# Patient Record
Sex: MALE | Race: BLACK OR AFRICAN AMERICAN | NOT HISPANIC OR LATINO | Employment: OTHER | URBAN - METROPOLITAN AREA
[De-identification: names, ages, dates, MRNs, and addresses within clinical notes are randomized per-mention and may not be internally consistent; named-entity substitution may affect disease eponyms.]

---

## 2017-05-16 ENCOUNTER — HISTORICAL (OUTPATIENT)
Dept: RADIOLOGY | Facility: HOSPITAL | Age: 32
End: 2017-05-16

## 2019-09-19 ENCOUNTER — HISTORICAL (OUTPATIENT)
Dept: RADIOLOGY | Facility: HOSPITAL | Age: 34
End: 2019-09-19

## 2020-11-16 ENCOUNTER — HISTORICAL (OUTPATIENT)
Dept: RADIOLOGY | Facility: HOSPITAL | Age: 35
End: 2020-11-16

## 2021-04-21 ENCOUNTER — HISTORICAL (OUTPATIENT)
Dept: SURGERY | Facility: HOSPITAL | Age: 36
End: 2021-04-21

## 2021-04-21 LAB
ABS NEUT (OLG): 6 X10(3)/MCL (ref 1.5–6.9)
ALBUMIN SERPL-MCNC: 3.9 GM/DL (ref 3.5–5)
ALBUMIN/GLOB SERPL: 1.1 RATIO (ref 1.1–2)
ALP SERPL-CCNC: 84 UNIT/L (ref 40–150)
ALT SERPL-CCNC: 18 UNIT/L (ref 0–55)
AST SERPL-CCNC: 14 UNIT/L (ref 5–34)
BASOPHILS # BLD AUTO: 0 X10(3)/MCL (ref 0–0.1)
BASOPHILS NFR BLD AUTO: 0 % (ref 0–1)
BILIRUB SERPL-MCNC: 0.2 MG/DL
BILIRUBIN DIRECT+TOT PNL SERPL-MCNC: 0.1 MG/DL (ref 0–0.5)
BILIRUBIN DIRECT+TOT PNL SERPL-MCNC: 0.1 MG/DL (ref 0–0.8)
BUN SERPL-MCNC: 12 MG/DL (ref 8.9–20.6)
CALCIUM SERPL-MCNC: 9.3 MG/DL (ref 8.4–10.2)
CHLORIDE SERPL-SCNC: 105 MMOL/L (ref 98–107)
CO2 SERPL-SCNC: 31 MMOL/L (ref 22–29)
CREAT SERPL-MCNC: 1.07 MG/DL (ref 0.73–1.18)
EOSINOPHIL # BLD AUTO: 0.3 X10(3)/MCL (ref 0–0.6)
EOSINOPHIL NFR BLD AUTO: 3 % (ref 0–5)
ERYTHROCYTE [DISTWIDTH] IN BLOOD BY AUTOMATED COUNT: 13.8 % (ref 11.5–17)
GLOBULIN SER-MCNC: 3.5 GM/DL (ref 2.4–3.5)
GLUCOSE SERPL-MCNC: 80 MG/DL (ref 74–100)
HCT VFR BLD AUTO: 43.7 % (ref 42–52)
HGB BLD-MCNC: 13.6 GM/DL (ref 14–18)
IMM GRANULOCYTES # BLD AUTO: 0.03 10*3/UL (ref 0–0.02)
IMM GRANULOCYTES NFR BLD AUTO: 0.3 % (ref 0–0.43)
INR PPP: 1.1 (ref 2–3)
LYMPHOCYTES # BLD AUTO: 2.1 X10(3)/MCL (ref 0.5–4.1)
LYMPHOCYTES NFR BLD AUTO: 22 % (ref 15–40)
MCH RBC QN AUTO: 25 PG (ref 27–34)
MCHC RBC AUTO-ENTMCNC: 31 GM/DL (ref 31–36)
MCV RBC AUTO: 82 FL (ref 80–99)
MONOCYTES # BLD AUTO: 0.8 X10(3)/MCL (ref 0–1.1)
MONOCYTES NFR BLD AUTO: 9 % (ref 4–12)
NEUTROPHILS # BLD AUTO: 6 X10(3)/MCL (ref 1.5–6.9)
NEUTROPHILS NFR BLD AUTO: 65 % (ref 43–75)
PLATELET # BLD AUTO: 182 X10(3)/MCL (ref 140–400)
PMV BLD AUTO: 11.2 FL (ref 6.8–10)
POTASSIUM SERPL-SCNC: 4.1 MMOL/L (ref 3.5–5.1)
PROT SERPL-MCNC: 7.4 GM/DL (ref 6.4–8.3)
PROTHROMBIN TIME: 13.8 SEC (ref 11.7–14.5)
RBC # BLD AUTO: 5.35 X10(6)/MCL (ref 4.7–6.1)
SODIUM SERPL-SCNC: 141 MMOL/L (ref 136–145)
WBC # SPEC AUTO: 9.3 X10(3)/MCL (ref 4.5–11.5)

## 2022-06-10 ENCOUNTER — HOSPITAL ENCOUNTER (EMERGENCY)
Facility: HOSPITAL | Age: 37
Discharge: HOME | End: 2022-06-10
Attending: STUDENT IN AN ORGANIZED HEALTH CARE EDUCATION/TRAINING PROGRAM
Payer: MEDICAID

## 2022-06-10 VITALS
TEMPERATURE: 98 F | RESPIRATION RATE: 18 BRPM | HEART RATE: 71 BPM | DIASTOLIC BLOOD PRESSURE: 79 MMHG | SYSTOLIC BLOOD PRESSURE: 115 MMHG | WEIGHT: 165 LBS | OXYGEN SATURATION: 100 % | BODY MASS INDEX: 25.9 KG/M2 | HEIGHT: 67 IN

## 2022-06-10 DIAGNOSIS — M1A.0710 IDIOPATHIC CHRONIC GOUT OF RIGHT ANKLE WITHOUT TOPHUS: Primary | ICD-10-CM

## 2022-06-10 PROCEDURE — 96372 THER/PROPH/DIAG INJ SC/IM: CPT | Performed by: PHYSICIAN ASSISTANT

## 2022-06-10 PROCEDURE — 63600175 PHARM REV CODE 636 W HCPCS: Performed by: PHYSICIAN ASSISTANT

## 2022-06-10 PROCEDURE — 99284 EMERGENCY DEPT VISIT MOD MDM: CPT | Mod: 25

## 2022-06-10 RX ORDER — COLCHICINE 0.6 MG/1
0.6 TABLET ORAL DAILY
Qty: 3 TABLET | Refills: 0 | OUTPATIENT
Start: 2022-06-10 | End: 2022-09-22

## 2022-06-10 RX ADMIN — METHYLPREDNISOLONE SODIUM SUCCINATE 20 MG: 40 INJECTION, POWDER, FOR SOLUTION INTRAMUSCULAR; INTRAVENOUS at 09:06

## 2022-06-11 NOTE — ED PROVIDER NOTES
"Encounter Date: 6/10/2022       History     Chief Complaint   Patient presents with    Foot Injury     REPORTS NEEDING A CORTISONE SHOT FOR HIS GOUT FLARE UP IN RIGHT FOOT; STARTED TAKING ALLOPURINOL A FEW WEEKS AGO      Patient with a history of gout currently going through a flare up comes in with exacerbation of symptoms. He is currently on allopurinol  Once a day but it is not helping him. He is scheduled to follow up with his PCP next week.         Review of patient's allergies indicates:   Allergen Reactions    Ibuprofen Other (See Comments)     Per patient triggers " my asthma".     Penicillins      Past Medical History:   Diagnosis Date    Anxiety     GERD (gastroesophageal reflux disease)     Gout, unspecified     Sciatic nerve disease      No past surgical history on file.  No family history on file.  Social History     Tobacco Use    Smoking status: Current Every Day Smoker     Packs/day: 0.50    Smokeless tobacco: Never Used   Substance Use Topics    Alcohol use: Yes    Drug use: Yes     Types: Marijuana     Review of Systems   Constitutional: Negative for fever.   HENT: Negative for sore throat.    Respiratory: Negative for shortness of breath.    Cardiovascular: Negative for chest pain.   Gastrointestinal: Negative for nausea.   Genitourinary: Negative for dysuria.   Musculoskeletal: Positive for arthralgias and back pain.        Right foot pain      Skin: Negative for rash.   Neurological: Negative for weakness.   Hematological: Does not bruise/bleed easily.       Physical Exam     Initial Vitals [06/10/22 2051]   BP Pulse Resp Temp SpO2   115/79 71 18 98.2 °F (36.8 °C) 100 %      MAP       --         Physical Exam    Nursing note and vitals reviewed.  Constitutional: He appears well-developed and well-nourished.   Neck:   Normal range of motion.  Cardiovascular: Normal rate and regular rhythm.   Musculoskeletal:         General: Normal range of motion.      Cervical back: Normal range of " motion.      Right ankle: Swelling present. No tenderness. Normal range of motion.      Right Achilles Tendon: Normal.      Right foot: Normal range of motion and normal capillary refill. No swelling. Normal pulse.      Left foot: Normal.     Neurological: He is alert and oriented to person, place, and time.   Skin: Skin is warm and dry.   Psychiatric: His behavior is normal. Judgment normal.         ED Course   Procedures  Labs Reviewed - No data to display       Imaging Results    None          Medications   methylPREDNISolone sodium succinate injection 20 mg (has no administration in time range)     Medical Decision Making:   Initial Assessment:   Right foot and toe tenderness history of gout.   Differential Diagnosis:   Gout     ED Management:  Antiinflammatory follow up with pcp                        Clinical Impression:   Final diagnoses:  [M1A.0710] Idiopathic chronic gout of right ankle without tophus (Primary)          ED Disposition Condition    Discharge Stable        ED Prescriptions     Medication Sig Dispense Start Date End Date Auth. Provider    colchicine (COLCRYS) 0.6 mg tablet Take 1 tablet (0.6 mg total) by mouth once daily. Take 1.2 mg x 1 day   Then 0.6 mg x 1 hour later  Repeat for 3 days for 3 days 3 tablet 6/10/2022 6/13/2022 TRUDY Cevallos        Follow-up Information     Follow up With Specialties Details Why Contact Info    Roby Caldwell NP Family Medicine In 1 week  [address] [facility] [address]  [phone]      Roby Caldwell NP Family Medicine  If symptoms worsen [address] [facility] [address]  [phone]             TRUDY Cevallos  06/10/22 2722

## 2022-09-20 ENCOUNTER — HOSPITAL ENCOUNTER (EMERGENCY)
Facility: HOSPITAL | Age: 37
Discharge: LEFT BEFORE DISCHARGE | End: 2022-09-20
Payer: MEDICAID

## 2022-09-20 VITALS
TEMPERATURE: 99 F | BODY MASS INDEX: 25.58 KG/M2 | WEIGHT: 163 LBS | RESPIRATION RATE: 20 BRPM | HEART RATE: 62 BPM | HEIGHT: 67 IN | SYSTOLIC BLOOD PRESSURE: 116 MMHG | OXYGEN SATURATION: 99 % | DIASTOLIC BLOOD PRESSURE: 73 MMHG

## 2022-09-20 DIAGNOSIS — E86.0 DEHYDRATION: Primary | ICD-10-CM

## 2022-09-20 PROCEDURE — 99900041 HC LEFT WITHOUT BEING SEEN- EMERGENCY

## 2022-09-20 PROCEDURE — 93010 EKG 12-LEAD: ICD-10-PCS | Mod: ,,, | Performed by: INTERNAL MEDICINE

## 2022-09-20 PROCEDURE — 93010 ELECTROCARDIOGRAM REPORT: CPT | Mod: ,,, | Performed by: INTERNAL MEDICINE

## 2022-09-20 PROCEDURE — 93005 ELECTROCARDIOGRAM TRACING: CPT

## 2022-09-22 ENCOUNTER — HOSPITAL ENCOUNTER (EMERGENCY)
Facility: HOSPITAL | Age: 37
Discharge: HOME | End: 2022-09-22
Attending: FAMILY MEDICINE
Payer: MEDICAID

## 2022-09-22 VITALS
OXYGEN SATURATION: 100 % | RESPIRATION RATE: 18 BRPM | DIASTOLIC BLOOD PRESSURE: 85 MMHG | WEIGHT: 163 LBS | TEMPERATURE: 99 F | HEIGHT: 67 IN | BODY MASS INDEX: 25.58 KG/M2 | HEART RATE: 92 BPM | SYSTOLIC BLOOD PRESSURE: 134 MMHG

## 2022-09-22 DIAGNOSIS — M10.9 ACUTE GOUT OF RIGHT ANKLE, UNSPECIFIED CAUSE: Primary | ICD-10-CM

## 2022-09-22 DIAGNOSIS — K04.7 DENTAL ABSCESS: ICD-10-CM

## 2022-09-22 PROCEDURE — 99284 EMERGENCY DEPT VISIT MOD MDM: CPT | Mod: 25

## 2022-09-22 PROCEDURE — 25000003 PHARM REV CODE 250: Performed by: FAMILY MEDICINE

## 2022-09-22 PROCEDURE — 96372 THER/PROPH/DIAG INJ SC/IM: CPT | Performed by: FAMILY MEDICINE

## 2022-09-22 PROCEDURE — 63600175 PHARM REV CODE 636 W HCPCS: Performed by: FAMILY MEDICINE

## 2022-09-22 RX ORDER — METHYLPREDNISOLONE 4 MG/1
TABLET ORAL
Qty: 21 EACH | Refills: 0 | Status: SHIPPED | OUTPATIENT
Start: 2022-09-22

## 2022-09-22 RX ORDER — KETOROLAC TROMETHAMINE 30 MG/ML
30 INJECTION, SOLUTION INTRAMUSCULAR; INTRAVENOUS
Status: COMPLETED | OUTPATIENT
Start: 2022-09-22 | End: 2022-09-22

## 2022-09-22 RX ORDER — DEXAMETHASONE SODIUM PHOSPHATE 4 MG/ML
8 INJECTION, SOLUTION INTRA-ARTICULAR; INTRALESIONAL; INTRAMUSCULAR; INTRAVENOUS; SOFT TISSUE
Status: COMPLETED | OUTPATIENT
Start: 2022-09-22 | End: 2022-09-22

## 2022-09-22 RX ORDER — CARIPRAZINE 1.5 MG/1
1.5 CAPSULE, GELATIN COATED ORAL DAILY
COMMUNITY
Start: 2022-09-20

## 2022-09-22 RX ORDER — CLINDAMYCIN HYDROCHLORIDE 300 MG/1
300 CAPSULE ORAL EVERY 8 HOURS
Qty: 30 CAPSULE | Refills: 0 | Status: SHIPPED | OUTPATIENT
Start: 2022-09-22 | End: 2022-10-02

## 2022-09-22 RX ORDER — CLONAZEPAM 1 MG/1
1 TABLET, ORALLY DISINTEGRATING ORAL
COMMUNITY
Start: 2021-04-21

## 2022-09-22 RX ORDER — ALLOPURINOL 100 MG/1
100 TABLET ORAL DAILY
COMMUNITY
Start: 2022-08-26

## 2022-09-22 RX ORDER — CHLORHEXIDINE GLUCONATE ORAL RINSE 1.2 MG/ML
15 SOLUTION DENTAL 2 TIMES DAILY
Qty: 420 ML | Refills: 0 | Status: SHIPPED | OUTPATIENT
Start: 2022-09-22 | End: 2022-10-06

## 2022-09-22 RX ORDER — HYDROCODONE BITARTRATE AND ACETAMINOPHEN 7.5; 325 MG/1; MG/1
1 TABLET ORAL EVERY 6 HOURS PRN
Qty: 12 TABLET | Refills: 0 | Status: SHIPPED | OUTPATIENT
Start: 2022-09-22 | End: 2022-09-25

## 2022-09-22 RX ORDER — COLCHICINE 0.6 MG/1
1.2 TABLET, FILM COATED ORAL
Status: COMPLETED | OUTPATIENT
Start: 2022-09-22 | End: 2022-09-22

## 2022-09-22 RX ORDER — FLUTICASONE PROPIONATE AND SALMETEROL XINAFOATE 115; 21 UG/1; UG/1
2 AEROSOL, METERED RESPIRATORY (INHALATION) 2 TIMES DAILY
COMMUNITY
Start: 2022-08-26

## 2022-09-22 RX ORDER — MONTELUKAST SODIUM 10 MG/1
10 TABLET ORAL NIGHTLY
COMMUNITY
Start: 2022-07-22

## 2022-09-22 RX ORDER — BUPROPION HYDROCHLORIDE 100 MG/1
TABLET, EXTENDED RELEASE ORAL
COMMUNITY

## 2022-09-22 RX ADMIN — DEXAMETHASONE SODIUM PHOSPHATE 8 MG: 4 INJECTION, SOLUTION INTRA-ARTICULAR; INTRALESIONAL; INTRAMUSCULAR; INTRAVENOUS; SOFT TISSUE at 10:09

## 2022-09-22 RX ADMIN — KETOROLAC TROMETHAMINE 30 MG: 30 INJECTION, SOLUTION INTRAMUSCULAR at 10:09

## 2022-09-22 RX ADMIN — COLCHICINE 1.2 MG: 0.6 TABLET, FILM COATED ORAL at 10:09

## 2022-09-24 NOTE — ED PROVIDER NOTES
"Encounter Date: 9/22/2022       History     Chief Complaint   Patient presents with    Dental Pain    Gout     Pt to ed with C/O top right dental pain, has broken tooth, also pain right foot d/t gout.      36-year-old male presents with nontraumatic right ankle pain and swelling for the past 2-3 days.  Patient has a history of gout and states this is similar to his normal gout flare.  Patient is also complaining of ongoing right upper dental pain.  Patient has a decaying tooth and was told he needed to see a oral surgeon in [place].  Patient has been unable to follow-up.  Patient denies fever drainage.  No other complaints.    Review of patient's allergies indicates:   Allergen Reactions    Aspirin Shortness Of Breath    Ibuprofen Other (See Comments)     Per patient triggers " my asthma".     Penicillins      Past Medical History:   Diagnosis Date    Anxiety     GERD (gastroesophageal reflux disease)     Gout, unspecified     Sciatic nerve disease      History reviewed. No pertinent surgical history.  No family history on file.  Social History     Tobacco Use    Smoking status: Every Day     Packs/day: 0.50     Types: Cigarettes    Smokeless tobacco: Never   Substance Use Topics    Alcohol use: Yes    Drug use: Yes     Types: Marijuana     Review of Systems   Constitutional: Negative.    HENT:  Positive for dental problem.    Eyes: Negative.    Respiratory: Negative.     Cardiovascular: Negative.    Gastrointestinal: Negative.    Endocrine: Negative.    Genitourinary: Negative.    Musculoskeletal:  Positive for joint swelling.   Skin: Negative.    Allergic/Immunologic: Negative.    Neurological: Negative.    Hematological: Negative.    Psychiatric/Behavioral: Negative.       Physical Exam     Initial Vitals [09/22/22 2142]   BP Pulse Resp Temp SpO2   134/85 92 18 98.6 °F (37 °C) 100 %      MAP       --         Physical Exam    Nursing note and vitals reviewed.  Constitutional: He appears well-developed and " well-nourished.   HENT:   Head: Normocephalic and atraumatic.   Mouth/Throat: Oropharynx is clear and moist.   Right upper premolar/molar-fractured and decaying teeth noted.  Mild erythema over the lateral gumline consistent with dental abscess.  No fluctuance or drainage.  Nothing to drain.   Eyes: Conjunctivae and EOM are normal. Pupils are equal, round, and reactive to light.   Neck: Neck supple.   Normal range of motion.  Cardiovascular:  Normal rate and regular rhythm.           Pulmonary/Chest: Breath sounds normal.   Abdominal: Bowel sounds are normal.   Musculoskeletal:         General: Normal range of motion.      Cervical back: Normal range of motion and neck supple.      Comments: Right ankle-mild inflammation noted diffusely.  No erythema or warmth.  No evidence of infection.  Mildly tender to palpation.  Consistent with gout flare.  Full range of motion, strength 5/5.  Sensation motion circulation intact throughout.     Neurological: He is alert and oriented to person, place, and time. He has normal strength.   Skin: Skin is warm and dry. Capillary refill takes less than 2 seconds.   Psychiatric: He has a normal mood and affect.       ED Course   Procedures  Labs Reviewed - No data to display       Imaging Results    None          Medications   ketorolac injection 30 mg (30 mg Intramuscular Given 9/22/22 2249)   dexamethasone injection 8 mg (8 mg Intramuscular Given 9/22/22 2249)   colchicine capsule/tablet 1.2 mg (1.2 mg Oral Given 9/22/22 2249)                              Clinical Impression:   Final diagnoses:  [M10.9] Acute gout of right ankle, unspecified cause (Primary)  [K04.7] Dental abscess        ED Disposition Condition    Discharge Stable          ED Prescriptions       Medication Sig Dispense Start Date End Date Auth. Provider    methylPREDNISolone (MEDROL DOSEPACK) 4 mg tablet use as directed 21 each 9/22/2022 -- Giovanni Lozano MD    HYDROcodone-acetaminophen (NORCO) 7.5-325 mg per  tablet Take 1 tablet by mouth every 6 (six) hours as needed for Pain. 12 tablet 9/22/2022 9/25/2022 Giovanni Lozano MD    clindamycin (CLEOCIN) 300 MG capsule Take 1 capsule (300 mg total) by mouth every 8 (eight) hours. for 10 days 30 capsule 9/22/2022 10/2/2022 Giovanni Lozano MD    chlorhexidine (PERIDEX) 0.12 % solution Use as directed 15 mLs in the mouth or throat 2 (two) times daily. for 14 days 420 mL 9/22/2022 10/6/2022 Giovanni Lozano MD          Follow-up Information       Follow up With Specialties Details Why Contact Info    Roby Caldwell NP Family Medicine   [facility] [address]  [phone]      Dentist  Go to                Giovanni Lozano MD  09/24/22 4317

## 2023-02-09 ENCOUNTER — HOSPITAL ENCOUNTER (EMERGENCY)
Facility: HOSPITAL | Age: 38
Discharge: HOME | End: 2023-02-09
Attending: EMERGENCY MEDICINE
Payer: MEDICAID

## 2023-02-09 VITALS
RESPIRATION RATE: 18 BRPM | SYSTOLIC BLOOD PRESSURE: 121 MMHG | DIASTOLIC BLOOD PRESSURE: 80 MMHG | BODY MASS INDEX: 24.17 KG/M2 | WEIGHT: 154 LBS | OXYGEN SATURATION: 100 % | TEMPERATURE: 98 F | HEIGHT: 67 IN | HEART RATE: 75 BPM

## 2023-02-09 DIAGNOSIS — F41.9 ANXIETY: Primary | ICD-10-CM

## 2023-02-09 PROCEDURE — 99282 EMERGENCY DEPT VISIT SF MDM: CPT

## 2023-02-09 NOTE — Clinical Note
"Venu Proin" Aaron was seen and treated in our emergency department on 2/9/2023.  He may return to work on 02/13/2023.       If you have any questions or concerns, please don't hesitate to call.      Lisa Abdalla NP"

## 2023-02-09 NOTE — DISCHARGE INSTRUCTIONS
Continue taking prescribed psychiatric medications.     Thanks for letting us take care of you today!  It is our goal to give you courteous care and to keep you comfortable and informed, if you have any questions before you leave I will be happy to try and answer them.    Here is some advice after your visit:      Your visit in the emergency department is NOT definitive care - please follow-up with your primary care doctor and/or specialist within 1 week.  Please return if you have any worsening in your condition or if you have any other concerns.

## 2023-02-09 NOTE — ED PROVIDER NOTES
"Encounter Date: 2/9/2023       History     Chief Complaint   Patient presents with    Anxiety     Pt c/o anxiety flare up after a "situation" at work yesterday.     37 y.o.  male with a history of anxiety, GERD, and sciatica presents to Emergency Department with a chief complaint of anxiety. Symptoms began yesterday after an incident at work and have been constant since onset. Patient reports he has been overwhelmed lately with work and home circumstances. Associated symptoms include none. Patient has reached out to his therapist and reports he has been compliant with psychiatric medications. The patient denies SI, HI, hallucinations, CP, SOB, weakness, or fever. No other reported symptoms at this time.      The history is provided by the patient. No  was used.   Anxiety  This is a chronic problem. The current episode started yesterday. The problem occurs daily. The problem has not changed since onset.Pertinent negatives include no chest pain, no abdominal pain, no headaches and no shortness of breath.   Review of patient's allergies indicates:   Allergen Reactions    Aspirin Shortness Of Breath    Ibuprofen Other (See Comments)     Per patient triggers " my asthma".     Penicillins      Past Medical History:   Diagnosis Date    Anxiety     GERD (gastroesophageal reflux disease)     Gout, unspecified     Sciatic nerve disease      History reviewed. No pertinent surgical history.  No family history on file.  Social History     Tobacco Use    Smoking status: Every Day     Packs/day: 0.50     Types: Cigarettes    Smokeless tobacco: Never   Substance Use Topics    Alcohol use: Yes    Drug use: Yes     Types: Marijuana     Review of Systems   Constitutional:  Negative for chills, fatigue and fever.   Eyes:  Negative for photophobia and visual disturbance.   Respiratory:  Negative for shortness of breath, wheezing and stridor.    Cardiovascular:  Negative for chest pain and leg " swelling.   Gastrointestinal:  Negative for abdominal pain, nausea and vomiting.   Neurological:  Negative for dizziness, syncope, weakness and headaches.   Psychiatric/Behavioral:  Negative for hallucinations and suicidal ideas. The patient is nervous/anxious.    All other systems reviewed and are negative.    Physical Exam     Initial Vitals [02/09/23 1123]   BP Pulse Resp Temp SpO2   121/80 75 18 97.9 °F (36.6 °C) 100 %      MAP       --         Physical Exam    Nursing note and vitals reviewed.  Constitutional: He appears well-developed and well-nourished. He is not diaphoretic. He is cooperative.  Non-toxic appearance. No distress.   HENT:   Head: Normocephalic and atraumatic.   Right Ear: External ear normal.   Left Ear: External ear normal.   Nose: Nose normal.   Mouth/Throat: Oropharynx is clear and moist. No oropharyngeal exudate.   Eyes: Conjunctivae and EOM are normal. Pupils are equal, round, and reactive to light. Right eye exhibits no discharge. Left eye exhibits no discharge.   Neck: Neck supple. No JVD present.   Normal range of motion.  Cardiovascular:  Normal rate, regular rhythm, S1 normal, S2 normal, normal heart sounds, intact distal pulses and normal pulses.           Pulmonary/Chest: Effort normal and breath sounds normal. No accessory muscle usage or stridor. No tachypnea. No respiratory distress. He has no decreased breath sounds. He has no wheezes. He has no rhonchi. He has no rales. He exhibits no tenderness.   Abdominal: Abdomen is soft. Bowel sounds are normal. He exhibits no distension. There is no abdominal tenderness. There is no guarding.   Musculoskeletal:         General: No tenderness or edema. Normal range of motion.      Cervical back: Normal range of motion and neck supple.     Neurological: He is alert and oriented to person, place, and time. He has normal strength. No sensory deficit. GCS score is 15. GCS eye subscore is 4. GCS verbal subscore is 5. GCS motor subscore is 6.    Skin: Skin is warm and dry. Capillary refill takes less than 2 seconds. No rash noted. No erythema.   Psychiatric: His speech is normal and behavior is normal. Thought content normal. His mood appears anxious. Cognition and memory are normal.       ED Course   Procedures  Labs Reviewed - No data to display       Imaging Results    None          Medications - No data to display  Medical Decision Making:   Initial Assessment:   Patient awake, alert, has non-labored breathing, and follows commands appropriately. C/o anxiety after incident at work on yesterday. Patient reports he has been overwhelmed with work and life circumstances and needs a few days off from work. Denies SI, HI, hallucinations, or any other complaints. Called therapist on yesterday due to symptoms. Compliant with psychiatric medications.   Differential Diagnosis:   Anxiety, Stress, Panic Attack   ED Management:  Co-morbidities and/or factors adding to the complexity or risk for the patient?: none  Differential diagnoses: Anxiety, Stress, Panic Attack   Decision to obtain previous or outside records?: I did not obtain previous or outside records.   Chart Review (nursing home, outside records, CareEverywhere): none  Review of RX medications/new RX prescribed by me?: Patient takes Klonipin and Wellbutrin for anxiety. Is compliant with medication.   Labs/imaging/other tests obtained/considered (risk/benefits of testing discussed): None  Labs/tests intepretation: none  My independent imaging interpretation: none  Treatment/interventions, IV fluids, IV medications: none  Complex management (IV controlled substances, went to OR, DNR, meds requiring monitoring, transfer, etc)?: none  Workup/treatment affected by social determinants of health?:none   Point of care US done/interpretation: none  Consults/radiologist/EMS/social work/family discussion/alternate history: none  Advanced care planning/end of life discussion: none  Shared decision making:  Discussed plan of care and interventions with patient. Patient reports he is overwhelmed and stressed and needs a few days off from work. Work excuse given. Denies SI/HI/hallucinations. Reports anti-anxiety medication is assisting with anxiety. Patient has outpatient follow up with therapist, reports he left a message with provider on yesterday. Agreed to and aware of plan of care. Comfortable being discharged home. Will continue to take prescribed medications.    ETOH/smoking/drug cessation discussion: none  Dispo: Patient discharged home. Patient denies new or additional complaints; no further tests indicated at this time. Verbalized understanding of instructions. No emergent or apparent distress noted prior to discharge. To follow up with PCP in 1 week as needed. Strict ER return precautions given.                             Clinical Impression:   Final diagnoses:  [F41.9] Anxiety (Primary)        ED Disposition Condition    Discharge Stable          ED Prescriptions    None       Follow-up Information       Follow up With Specialties Details Why Contact Info    Juno Mendoza MD Family Medicine Call in 1 week If symptoms worsen, As needed [address] [facility] [address]  [phone]      [facility] - Emergency Dept Emergency Medicine Go to  If symptoms worsen, As needed [address]  [facility] [phone]             Lisa Abdalla NP  02/09/23 7302

## 2023-02-10 ENCOUNTER — HOSPITAL ENCOUNTER (EMERGENCY)
Facility: HOSPITAL | Age: 38
Discharge: LEFT BEFORE DISCHARGE | End: 2023-02-10
Payer: MEDICAID

## 2023-02-10 VITALS
OXYGEN SATURATION: 100 % | RESPIRATION RATE: 18 BRPM | DIASTOLIC BLOOD PRESSURE: 77 MMHG | TEMPERATURE: 98 F | SYSTOLIC BLOOD PRESSURE: 117 MMHG | HEART RATE: 99 BPM

## 2023-02-10 PROCEDURE — 99283 EMERGENCY DEPT VISIT LOW MDM: CPT

## 2023-07-25 DIAGNOSIS — R19.7 DIARRHEA, UNSPECIFIED TYPE: Primary | ICD-10-CM

## 2023-08-18 ENCOUNTER — HOSPITAL ENCOUNTER (EMERGENCY)
Facility: HOSPITAL | Age: 38
Discharge: HOME | End: 2023-08-18
Attending: EMERGENCY MEDICINE
Payer: MEDICAID

## 2023-08-18 VITALS
OXYGEN SATURATION: 100 % | SYSTOLIC BLOOD PRESSURE: 106 MMHG | HEIGHT: 67 IN | DIASTOLIC BLOOD PRESSURE: 77 MMHG | HEART RATE: 86 BPM | TEMPERATURE: 97 F | RESPIRATION RATE: 18 BRPM | WEIGHT: 154 LBS | BODY MASS INDEX: 24.17 KG/M2

## 2023-08-18 DIAGNOSIS — E86.0 DEHYDRATION: Primary | ICD-10-CM

## 2023-08-18 DIAGNOSIS — K04.7 DENTAL ABSCESS: ICD-10-CM

## 2023-08-18 DIAGNOSIS — T67.1XXA HEAT SYNCOPE, INITIAL ENCOUNTER: ICD-10-CM

## 2023-08-18 LAB
ALBUMIN SERPL-MCNC: 4.2 G/DL (ref 3.5–5)
ALBUMIN/GLOB SERPL: 1.3 RATIO (ref 1.1–2)
ALP SERPL-CCNC: 87 UNIT/L (ref 40–150)
ALT SERPL-CCNC: 12 UNIT/L (ref 0–55)
APPEARANCE UR: CLEAR
AST SERPL-CCNC: 12 UNIT/L (ref 5–34)
BASOPHILS # BLD AUTO: 0.03 X10(3)/MCL
BASOPHILS NFR BLD AUTO: 0.3 %
BILIRUB SERPL-MCNC: 0.5 MG/DL
BILIRUB UR QL STRIP.AUTO: NEGATIVE
BUN SERPL-MCNC: 8.6 MG/DL (ref 8.9–20.6)
CALCIUM SERPL-MCNC: 9.8 MG/DL (ref 8.4–10.2)
CHLORIDE SERPL-SCNC: 105 MMOL/L (ref 98–107)
CK SERPL-CCNC: 148 U/L (ref 30–200)
CO2 SERPL-SCNC: 29 MMOL/L (ref 22–29)
COLOR UR: YELLOW
CREAT SERPL-MCNC: 1.24 MG/DL (ref 0.73–1.18)
EOSINOPHIL # BLD AUTO: 0.25 X10(3)/MCL (ref 0–0.9)
EOSINOPHIL NFR BLD AUTO: 2.1 %
ERYTHROCYTE [DISTWIDTH] IN BLOOD BY AUTOMATED COUNT: 13.2 % (ref 11.5–17)
FLUAV AG UPPER RESP QL IA.RAPID: NOT DETECTED
FLUBV AG UPPER RESP QL IA.RAPID: NOT DETECTED
GFR SERPLBLD CREATININE-BSD FMLA CKD-EPI: >60 MLS/MIN/1.73/M2
GLOBULIN SER-MCNC: 3.3 GM/DL (ref 2.4–3.5)
GLUCOSE SERPL-MCNC: 88 MG/DL (ref 74–100)
GLUCOSE UR QL STRIP.AUTO: NEGATIVE
HCT VFR BLD AUTO: 39.7 % (ref 42–52)
HGB BLD-MCNC: 13 G/DL (ref 14–18)
IMM GRANULOCYTES # BLD AUTO: 0.03 X10(3)/MCL (ref 0–0.04)
IMM GRANULOCYTES NFR BLD AUTO: 0.3 %
KETONES UR QL STRIP.AUTO: NEGATIVE
LEUKOCYTE ESTERASE UR QL STRIP.AUTO: NEGATIVE
LYMPHOCYTES # BLD AUTO: 2.26 X10(3)/MCL (ref 0.6–4.6)
LYMPHOCYTES NFR BLD AUTO: 18.9 %
MCH RBC QN AUTO: 25.8 PG (ref 27–31)
MCHC RBC AUTO-ENTMCNC: 32.7 G/DL (ref 33–36)
MCV RBC AUTO: 78.9 FL (ref 80–94)
MONOCYTES # BLD AUTO: 0.8 X10(3)/MCL (ref 0.1–1.3)
MONOCYTES NFR BLD AUTO: 6.7 %
NEUTROPHILS # BLD AUTO: 8.56 X10(3)/MCL (ref 2.1–9.2)
NEUTROPHILS NFR BLD AUTO: 71.7 %
NITRITE UR QL STRIP.AUTO: NEGATIVE
NRBC BLD AUTO-RTO: 0 %
PH UR STRIP.AUTO: 6 [PH]
PLATELET # BLD AUTO: 188 X10(3)/MCL (ref 130–400)
PMV BLD AUTO: 10.4 FL (ref 7.4–10.4)
POTASSIUM SERPL-SCNC: 4 MMOL/L (ref 3.5–5.1)
PROT SERPL-MCNC: 7.5 GM/DL (ref 6.4–8.3)
PROT UR QL STRIP.AUTO: NEGATIVE
RBC # BLD AUTO: 5.03 X10(6)/MCL (ref 4.7–6.1)
RBC UR QL AUTO: NEGATIVE
SARS-COV-2 RNA RESP QL NAA+PROBE: NOT DETECTED
SODIUM SERPL-SCNC: 142 MMOL/L (ref 136–145)
SP GR UR STRIP.AUTO: >=1.03
TROPONIN I SERPL-MCNC: <0.01 NG/ML (ref 0–0.04)
UROBILINOGEN UR STRIP-ACNC: 0.2
WBC # SPEC AUTO: 11.93 X10(3)/MCL (ref 4.5–11.5)

## 2023-08-18 PROCEDURE — 85025 COMPLETE CBC W/AUTO DIFF WBC: CPT | Performed by: PHYSICIAN ASSISTANT

## 2023-08-18 PROCEDURE — 99285 EMERGENCY DEPT VISIT HI MDM: CPT | Mod: 25

## 2023-08-18 PROCEDURE — 96374 THER/PROPH/DIAG INJ IV PUSH: CPT

## 2023-08-18 PROCEDURE — 82550 ASSAY OF CK (CPK): CPT | Performed by: PHYSICIAN ASSISTANT

## 2023-08-18 PROCEDURE — 80053 COMPREHEN METABOLIC PANEL: CPT | Performed by: PHYSICIAN ASSISTANT

## 2023-08-18 PROCEDURE — 63600175 PHARM REV CODE 636 W HCPCS: Performed by: PHYSICIAN ASSISTANT

## 2023-08-18 PROCEDURE — 81003 URINALYSIS AUTO W/O SCOPE: CPT | Performed by: PHYSICIAN ASSISTANT

## 2023-08-18 PROCEDURE — 0240U COVID/FLU A&B PCR: CPT | Performed by: PHYSICIAN ASSISTANT

## 2023-08-18 PROCEDURE — 93005 ELECTROCARDIOGRAM TRACING: CPT

## 2023-08-18 PROCEDURE — 93010 EKG 12-LEAD: ICD-10-PCS | Mod: ,,, | Performed by: STUDENT IN AN ORGANIZED HEALTH CARE EDUCATION/TRAINING PROGRAM

## 2023-08-18 PROCEDURE — 25000003 PHARM REV CODE 250: Performed by: PHYSICIAN ASSISTANT

## 2023-08-18 PROCEDURE — 93010 ELECTROCARDIOGRAM REPORT: CPT | Mod: ,,, | Performed by: STUDENT IN AN ORGANIZED HEALTH CARE EDUCATION/TRAINING PROGRAM

## 2023-08-18 PROCEDURE — 84484 ASSAY OF TROPONIN QUANT: CPT | Performed by: PHYSICIAN ASSISTANT

## 2023-08-18 RX ORDER — ONDANSETRON 2 MG/ML
4 INJECTION INTRAMUSCULAR; INTRAVENOUS
Status: COMPLETED | OUTPATIENT
Start: 2023-08-18 | End: 2023-08-18

## 2023-08-18 RX ORDER — CLINDAMYCIN HYDROCHLORIDE 300 MG/1
300 CAPSULE ORAL EVERY 8 HOURS
Qty: 30 CAPSULE | Refills: 0 | Status: SHIPPED | OUTPATIENT
Start: 2023-08-18 | End: 2023-08-28

## 2023-08-18 RX ADMIN — ONDANSETRON 4 MG: 2 INJECTION INTRAMUSCULAR; INTRAVENOUS at 05:08

## 2023-08-18 RX ADMIN — SODIUM CHLORIDE 1000 ML: 9 INJECTION, SOLUTION INTRAVENOUS at 05:08

## 2023-08-18 NOTE — ED PROVIDER NOTES
"Encounter Date: 8/18/2023       History     Chief Complaint   Patient presents with    Loss of Consciousness     Pt states became dizzy on last night followed by passing out and hitting head on television. Pt aaox3 currently. States feels dehydrated.     37-year-old man with a history of anxiety, GERD, gout and sciatic nerve issues presents to the ED for evaluation after syncopal episode last night.  States he was talking to a friend last night on the porch, when he became lightheaded and passed out hitting the left side of his head on a TV.  Notes a mild headache last night however that has since resolved.  Notes some intermittent nausea today.  Has been having chills and indigestion as well.  Denies neck pain, chest pain, shortness breath, vomiting, fever.  No known sick contacts.  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  States he has been trying to drink fluids however not sure if it is enough.    The history is provided by the patient. No  was used.     Review of patient's allergies indicates:   Allergen Reactions    Aspirin Shortness Of Breath    Ibuprofen Other (See Comments)     Per patient triggers " my asthma".     Penicillins      Past Medical History:   Diagnosis Date    Anxiety     Anxiety disorder, unspecified     GERD (gastroesophageal reflux disease)     Gout, unspecified     Sciatic nerve disease      No past surgical history on file.  No family history on file.  Social History     Tobacco Use    Smoking status: Every Day     Current packs/day: 0.50     Types: Cigarettes    Smokeless tobacco: Never   Substance Use Topics    Alcohol use: Yes    Drug use: Yes     Types: Marijuana     Review of Systems   Constitutional:  Positive for chills. Negative for fever.   HENT:  Positive for dental problem. Negative for congestion and sore throat.    Respiratory:  Negative for cough and shortness of breath.  "   Cardiovascular:  Negative for chest pain.   Gastrointestinal:  Positive for nausea. Negative for abdominal pain and vomiting.   Genitourinary:  Negative for dysuria.   Musculoskeletal:  Negative for back pain and neck pain.   Skin:  Negative for rash.   Neurological:  Positive for syncope, light-headedness and headaches. Negative for weakness.   Hematological:  Does not bruise/bleed easily.   All other systems reviewed and are negative.      Physical Exam     Initial Vitals [08/18/23 1427]   BP Pulse Resp Temp SpO2   106/77 86 18 97.3 °F (36.3 °C) 100 %      MAP       --         Physical Exam    Nursing note and vitals reviewed.  Constitutional: He appears well-developed and well-nourished.   HENT:   Head: Normocephalic and atraumatic. Head is without raccoon's eyes, without laceration, without right periorbital erythema and without left periorbital erythema.       Right Ear: External ear normal.   Left Ear: External ear normal.   Mouth/Throat: Uvula is midline and oropharynx is clear and moist. No oral lesions. No trismus in the jaw. Abnormal dentition. Dental abscesses (some swelling noted to left lower gumline, no drainable fluid collection) and dental caries present. No uvula swelling.   Eyes: EOM are normal. Pupils are equal, round, and reactive to light.   Neck: Neck supple.   Normal range of motion.   Full passive range of motion without pain.     Cardiovascular:  Normal rate, regular rhythm and normal heart sounds.           Pulmonary/Chest: Breath sounds normal.   Abdominal: Abdomen is soft.   Musculoskeletal:         General: Normal range of motion.      Cervical back: Full passive range of motion without pain, normal range of motion and neck supple. No spinous process tenderness or muscular tenderness. Normal range of motion.     Neurological: He is alert and oriented to person, place, and time. He has normal strength. No sensory deficit. He displays a negative Romberg sign. Coordination and gait  normal. GCS score is 15. GCS eye subscore is 4. GCS verbal subscore is 5. GCS motor subscore is 6.   5/5 strength bilateral upper and lower extremities, no droop or drift noted on exam   Skin: Skin is warm and dry.   Psychiatric: He has a normal mood and affect.         ED Course   Procedures  Labs Reviewed   COMPREHENSIVE METABOLIC PANEL - Abnormal; Notable for the following components:       Result Value    Blood Urea Nitrogen 8.6 (*)     Creatinine 1.24 (*)     All other components within normal limits   CBC WITH DIFFERENTIAL - Abnormal; Notable for the following components:    WBC 11.93 (*)     Hgb 13.0 (*)     Hct 39.7 (*)     MCV 78.9 (*)     MCH 25.8 (*)     MCHC 32.7 (*)     All other components within normal limits   COVID/FLU A&B PCR - Normal    Narrative:     The Xpert Xpress SARS-CoV-2/FLU/RSV plus is a rapid, multiplexed real-time PCR test intended for the simultaneous qualitative detection and differentiation of SARS-CoV-2, Influenza A, Influenza B, and respiratory syncytial virus (RSV) viral RNA in either nasopharyngeal swab or nasal swab specimens.         TROPONIN I - Normal   CK - Normal   CBC W/ AUTO DIFFERENTIAL    Narrative:     The following orders were created for panel order CBC auto differential.  Procedure                               Abnormality         Status                     ---------                               -----------         ------                     CBC with Differential[[id number]]        Abnormal            Final result                 Please view results for these tests on the individual orders.   URINALYSIS, REFLEX TO URINE CULTURE     EKG Readings: (Independently Interpreted)   Initial Reading: No STEMI. Rhythm: Sinus Bradycardia. Heart Rate: 56. Ectopy: No Ectopy. Conduction: Normal. ST Segments: Normal ST Segments. T Waves: Normal. Axis: Normal.       Imaging Results              CT Head Without Contrast (Final result)  Result time 08/18/23 16:32:11      Final result  by Latisha Burton MD (08/18/23 16:32:11)                   Impression:      No acute intracranial abnormality.      Electronically signed by: Latisha Burton  Date:    08/18/2023  Time:    16:32               Narrative:    EXAMINATION:  CT HEAD WITHOUT CONTRAST    CLINICAL HISTORY:  Syncope, recurrent;    TECHNIQUE:  Axial scans were obtained from skull base to the vertex.    Coronal and sagittal reconstructions obtained from the axial data.    Automatic exposure control was utilized to limit radiation dose.    Contrast: None    Radiation Dose:    Total DLP: 743 mGy*cm    COMPARISON:  None    FINDINGS:  There is no acute intracranial hemorrhage or edema. The gray-white matter differentiation is preserved.    There is no mass effect or midline shift. The ventricles and sulci are normal in size. The basal cisterns are patent. There is no abnormal extra-axial fluid collection.    The calvarium and skull base are intact.  There is mild scattered paranasal sinus mucosal thickening.                                       Medications   sodium chloride 0.9% bolus 1,000 mL 1,000 mL (0 mLs Intravenous Stopped 8/18/23 1841)   ondansetron injection 4 mg (4 mg Intravenous Given 8/18/23 1740)     Medical Decision Making  Patient presenting after syncopal episode last night while sitting outside in the heat.  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  Denies any other concerning symptoms.  Labs, UA, EKG and head CT ordered.  COVID swab negative, CTA of the head negative for any acute abnormality.  EKG unremarkable.  Labs unremarkable besides mild leukocytosis and mildly elevated creatinine.  IV fluids ordered.  Patient not having any complaints at this time.  Will dispo home with instructions for strict oral hydration as well as antibiotics for dental abscess of the left lower gumline.    Differential diagnosis:  ACS, arrhythmia, TIA vs CVA, syncope, COCO, heat exhaustion, dehydration, UTI,  intracranial hemorrhage, skull fracture, COVID, flu, viral syndrome    Amount and/or Complexity of Data Reviewed  Labs: ordered. Decision-making details documented in ED Course.  Radiology: ordered. Decision-making details documented in ED Course.  ECG/medicine tests: ordered. Decision-making details documented in ED Course.    Risk  Prescription drug management.               ED Course as of 08/18/23 1814   Fri Aug 18, 2023   1702 SARS-CoV2 (COVID-19) Qualitative PCR: Not Detected [MA]   1702 Troponin I: <0.010 [MA]   1702 Creatinine(!): 1.24 [MA]   1730 Patient also now c/o left lower mouth pain and swelling that started yesterday. States he has been waiting on insurance to get him approved to remove his teeth however has not heard back in a few weeks. States he also ran out of his Protonix about 4-5 months ago and has been waiting on pre-authorization to get refills, that is why he thinks his indigestion has been so bad [MA]      ED Course User Index  [MA] Martin Mayfield PA-C                      Clinical Impression:   Final diagnoses:  [E86.0] Dehydration (Primary)  [T67.1XXA] Heat syncope, initial encounter  [K04.7] Dental abscess        ED Disposition Condition    Discharge Stable          ED Prescriptions       Medication Sig Dispense Start Date End Date Auth. Provider    clindamycin (CLEOCIN) 300 MG capsule Take 1 capsule (300 mg total) by mouth every 8 (eight) hours. for 10 days 30 capsule 8/18/2023 8/28/2023 Martin Mayfield PA-C          Follow-up Information       Follow up With Specialties Details Why Contact Info    Juno Mendoza MD Family Medicine   [address] [facility] [address] [phone]      [facility] - Emergency Dept Emergency Medicine In 1 week If symptoms worsen [address]  [place] [address] [phone]             Martin Mayfield PA-C  08/18/23 4156

## 2023-08-18 NOTE — ED TRIAGE NOTES
Pt states became dizzy on last night followed by passing out and hitting head on television. Pt aaox3 currently. States feels dehydrated.

## 2024-03-20 ENCOUNTER — HOSPITAL ENCOUNTER (EMERGENCY)
Facility: HOSPITAL | Age: 39
Discharge: HOME | End: 2024-03-20
Attending: INTERNAL MEDICINE
Payer: MEDICARE

## 2024-03-20 VITALS
RESPIRATION RATE: 18 BRPM | OXYGEN SATURATION: 99 % | TEMPERATURE: 99 F | WEIGHT: 140 LBS | DIASTOLIC BLOOD PRESSURE: 86 MMHG | BODY MASS INDEX: 21.97 KG/M2 | HEART RATE: 94 BPM | HEIGHT: 67 IN | SYSTOLIC BLOOD PRESSURE: 128 MMHG

## 2024-03-20 DIAGNOSIS — K08.9 CHRONIC DENTAL PAIN: ICD-10-CM

## 2024-03-20 DIAGNOSIS — K04.7 DENTAL INFECTION: Primary | ICD-10-CM

## 2024-03-20 DIAGNOSIS — G89.29 CHRONIC DENTAL PAIN: ICD-10-CM

## 2024-03-20 PROCEDURE — 99284 EMERGENCY DEPT VISIT MOD MDM: CPT

## 2024-03-20 RX ORDER — IBUPROFEN 600 MG/1
600 TABLET ORAL EVERY 6 HOURS PRN
Qty: 20 TABLET | Refills: 0 | Status: SHIPPED | OUTPATIENT
Start: 2024-03-20 | End: 2024-05-08

## 2024-03-20 RX ORDER — ACETAMINOPHEN AND CODEINE PHOSPHATE 300; 30 MG/1; MG/1
1 TABLET ORAL EVERY 6 HOURS PRN
Qty: 15 TABLET | Refills: 0 | OUTPATIENT
Start: 2024-03-20 | End: 2024-04-14

## 2024-03-20 RX ORDER — CLINDAMYCIN HYDROCHLORIDE 300 MG/1
300 CAPSULE ORAL EVERY 8 HOURS
Qty: 30 CAPSULE | Refills: 0 | Status: SHIPPED | OUTPATIENT
Start: 2024-03-20 | End: 2024-03-30

## 2024-03-20 RX ORDER — CHLORHEXIDINE GLUCONATE ORAL RINSE 1.2 MG/ML
15 SOLUTION DENTAL 2 TIMES DAILY
Qty: 473 ML | Refills: 0 | OUTPATIENT
Start: 2024-03-20 | End: 2024-06-05

## 2024-03-20 NOTE — ED PROVIDER NOTES
"     Source of History:  Patient, no limitations    Chief complaint:  Dental Pain      HPI:  Venu Walker is a 38 y.o. male presenting with Dental Pain         Patient who presents with complaint of toothache. Onset of symptoms was several days ago. Patient describes pain as aching. Pain severity now is severe. The pain does radiate. Patient has jaw swelling without fever >101. Pain is aggravated by use. Pain is alleviated by nothing. The patient denies other complaints. Patient has sought treatment by another care provider for this problem.       Review of Systems   Constitutional symptoms:  Negative except as documented in HPI.   Skin symptoms:  Negative except as documented in HPI.   HEENT symptoms:  Negative except as documented in HPI.   Respiratory symptoms:  Negative except as documented in HPI.   Cardiovascular symptoms:  Negative except as documented in HPI.   Gastrointestinal symptoms:  Negative except as documented in HPI.    Genitourinary symptoms:  Negative except as documented in HPI.   Musculoskeletal symptoms:  Negative except as documented in HPI.   Neurologic symptoms:  Negative except as documented in HPI.   Psychiatric symptoms:  Negative except as documented in HPI.   Allergy/immunologic symptoms:  Negative except as documented in HPI.             Additional review of systems information: All other systems reviewed and otherwise negative.      Review of patient's allergies indicates:   Allergen Reactions    Aspirin Shortness Of Breath    Ibuprofen Other (See Comments)     Per patient triggers " my asthma".     Penicillins        PMH:  As per HPI and below:    Past Medical History:   Diagnosis Date    Anxiety     Anxiety disorder, unspecified     GERD (gastroesophageal reflux disease)     Gout, unspecified     Sciatic nerve disease         History reviewed. No pertinent family history.    History reviewed. No pertinent surgical history.    Social History     Tobacco Use    Smoking status: " "Every Day     Current packs/day: 0.50     Types: Cigarettes    Smokeless tobacco: Never   Substance Use Topics    Alcohol use: Yes    Drug use: Yes     Types: Marijuana       Patient Active Problem List   Diagnosis    Pre-op testing    Caries        Physical Exam:    /86 (BP Location: Left arm, Patient Position: Sitting)   Pulse 94   Temp 98.8 °F (37.1 °C) (Oral)   Resp 18   Ht 5' 7" (1.702 m)   Wt 63.5 kg (140 lb)   SpO2 99%   BMI 21.93 kg/m²     Nursing note and vital signs reviewed.    General:  Alert, no acute distress.   Skin: Normal for Ethnic Origin, No cyanosis  HEENT: Normocephalic and atraumatic, Vision unchanged, Pupils symmetric,very poor dentition, #3 and #4 cavities into root (see pictures below)  Cardiovascular:  Regular rate and rhythm, No edema  Chest Wall: No deformity, equal chest rise  Respiratory:  Lungs are clear to auscultation, respirations are non-labored.    Musculoskeletal:  No deformity, Normal perfusion to all extremities  Gastrointestinal:  Soft, Non distended  Neurological:  Alert and oriented, normal motor observed, normal speech observed.    Psychiatric:  Cooperative, appropriate mood & affect.         Media Information    File Link    Scan on 3/20/2024  1:36 PM by Artemio Mckee DO        Media Information    File Link    Scan on 3/20/2024  1:36 PM by Artemio Mckee DO        Labs that have been ordered have been independently reviewed and interpreted by myself.     Old Chart Reviewed.      Initial Impression/ Differential Dx:  Fractured tooth, dental caries, gingivitis, stomatitis, abscess, pulpitis, nerve irritation      MDM:      Reviewed Nurses Note.    Reviewed Pertinent old records.    Orders Placed This Encounter    clindamycin (CLEOCIN) 300 MG capsule    chlorhexidine (PERIDEX) 0.12 % solution    acetaminophen-codeine 300-30mg (TYLENOL #3) 300-30 mg Tab    ibuprofen (ADVIL,MOTRIN) 600 MG tablet                    Labs Reviewed - No data to display "       No orders to display        No visits with results within 1 Day(s) from this visit.   Latest known visit with results is:   Admission on 08/18/2023, Discharged on 08/18/2023   Component Date Value Ref Range Status    Sodium Level 08/18/2023 142  136 - 145 mmol/L Final    Potassium Level 08/18/2023 4.0  3.5 - 5.1 mmol/L Final    Chloride 08/18/2023 105  98 - 107 mmol/L Final    Carbon Dioxide 08/18/2023 29  22 - 29 mmol/L Final    Glucose Level 08/18/2023 88  74 - 100 mg/dL Final    Blood Urea Nitrogen 08/18/2023 8.6 (L)  8.9 - 20.6 mg/dL Final    Creatinine 08/18/2023 1.24 (H)  0.73 - 1.18 mg/dL Final    Calcium Level Total 08/18/2023 9.8  8.4 - 10.2 mg/dL Final    Protein Total 08/18/2023 7.5  6.4 - 8.3 gm/dL Final    Albumin Level 08/18/2023 4.2  3.5 - 5.0 g/dL Final    Globulin 08/18/2023 3.3  2.4 - 3.5 gm/dL Final    Albumin/Globulin Ratio 08/18/2023 1.3  1.1 - 2.0 ratio Final    Bilirubin Total 08/18/2023 0.5  <=1.5 mg/dL Final    Alkaline Phosphatase 08/18/2023 87  40 - 150 unit/L Final    Alanine Aminotransferase 08/18/2023 12  0 - 55 unit/L Final    Aspartate Aminotransferase 08/18/2023 12  5 - 34 unit/L Final    eGFR 08/18/2023 >60  mls/min/1.73/m2 Final    Influenza A PCR 08/18/2023 Not Detected  Not Detected Final    Influenza B PCR 08/18/2023 Not Detected  Not Detected Final    SARS-CoV-2 PCR 08/18/2023 Not Detected  Not Detected, Negative, Invalid Final    Troponin-I 08/18/2023 <0.010  0.000 - 0.045 ng/mL Final    Color, UA 08/18/2023 Yellow  Yellow, Light-Yellow, Dark Yellow, [place], Straw Final    Appearance, UA 08/18/2023 Clear  Clear Final    Specific Gravity, UA 08/18/2023 >=1.030   Final    pH, UA 08/18/2023 6.0  5.0 - 8.5 Final    Protein, UA 08/18/2023 Negative  Negative Final    Glucose, UA 08/18/2023 Negative  Negative, Normal Final    Ketones, UA 08/18/2023 Negative  Negative Final    Blood, UA 08/18/2023 Negative  Negative Final    Bilirubin, UA 08/18/2023 Negative  Negative Final     Urobilinogen, UA 08/18/2023 0.2  0.2, 1.0, Normal Final    Nitrites, UA 08/18/2023 Negative  Negative Final    Leukocyte Esterase, UA 08/18/2023 Negative  Negative Final    Creatine Kinase 08/18/2023 148  30 - 200 U/L Final    WBC 08/18/2023 11.93 (H)  4.50 - 11.50 x10(3)/mcL Final    RBC 08/18/2023 5.03  4.70 - 6.10 x10(6)/mcL Final    Hgb 08/18/2023 13.0 (L)  14.0 - 18.0 g/dL Final    Hct 08/18/2023 39.7 (L)  42.0 - 52.0 % Final    MCV 08/18/2023 78.9 (L)  80.0 - 94.0 fL Final    MCH 08/18/2023 25.8 (L)  27.0 - 31.0 pg Final    MCHC 08/18/2023 32.7 (L)  33.0 - 36.0 g/dL Final    RDW 08/18/2023 13.2  11.5 - 17.0 % Final    Platelet 08/18/2023 188  130 - 400 x10(3)/mcL Final    MPV 08/18/2023 10.4  7.4 - 10.4 fL Final    Neut % 08/18/2023 71.7  % Final    Lymph % 08/18/2023 18.9  % Final    Mono % 08/18/2023 6.7  % Final    Eos % 08/18/2023 2.1  % Final    Basophil % 08/18/2023 0.3  % Final    Lymph # 08/18/2023 2.26  0.6 - 4.6 x10(3)/mcL Final    Neut # 08/18/2023 8.56  2.1 - 9.2 x10(3)/mcL Final    Mono # 08/18/2023 0.80  0.1 - 1.3 x10(3)/mcL Final    Eos # 08/18/2023 0.25  0 - 0.9 x10(3)/mcL Final    Baso # 08/18/2023 0.03  <=0.2 x10(3)/mcL Final    IG# 08/18/2023 0.03  0 - 0.04 x10(3)/mcL Final    IG% 08/18/2023 0.3  % Final    NRBC% 08/18/2023 0.0  % Final       Imaging Results    None                                              Diagnostic Impression:    1. Dental infection    2. Chronic dental pain         ED Disposition Condition    Discharge Stable             Follow-up Information       [facility] - Emergency Dept.    Specialty: Emergency Medicine  Why: If symptoms worsen  Contact information:  [address]  [facility] [address] [phone]                            ED Prescriptions       Medication Sig Dispense Start Date End Date Auth. Provider    clindamycin (CLEOCIN) 300 MG capsule Take 1 capsule (300 mg total) by mouth every 8 (eight) hours. for 10 days  30 capsule 3/20/2024 3/30/2024 Artemio Mckee DO    chlorhexidine (PERIDEX) 0.12 % solution Use as directed 15 mLs in the mouth or throat 2 (two) times daily. 473 mL 3/20/2024 -- Artemio Mckee DO    acetaminophen-codeine 300-30mg (TYLENOL #3) 300-30 mg Tab Take 1 tablet by mouth every 6 (six) hours as needed. 15 tablet 3/20/2024 -- Artemio Mckee DO    ibuprofen (ADVIL,MOTRIN) 600 MG tablet Take 1 tablet (600 mg total) by mouth every 6 (six) hours as needed for Pain. 20 tablet 3/20/2024 -- Artemio Mckee DO          Follow-up Information       Follow up With Specialties Details Why Contact Info    [facility] - Emergency Dept Emergency Medicine  If symptoms worsen [address]  [facility] [id number] [phone]             Artemio Mckee DO  03/20/24 2338

## 2024-03-20 NOTE — ED TRIAGE NOTES
C/o dental pain with red/swollen gums for past few days. Awaiting dental referrals for extractions. Visible decay and gum swelling throughout

## 2024-04-13 ENCOUNTER — HOSPITAL ENCOUNTER (EMERGENCY)
Facility: HOSPITAL | Age: 39
Discharge: HOME | End: 2024-04-14
Attending: FAMILY MEDICINE
Payer: MEDICARE

## 2024-04-13 DIAGNOSIS — K04.7 DENTAL INFECTION: Primary | ICD-10-CM

## 2024-04-13 DIAGNOSIS — K08.89 PAIN, DENTAL: ICD-10-CM

## 2024-04-13 PROCEDURE — 99284 EMERGENCY DEPT VISIT MOD MDM: CPT

## 2024-04-13 RX ORDER — BUPROPION HYDROCHLORIDE 150 MG/1
150 TABLET, EXTENDED RELEASE ORAL
COMMUNITY
Start: 2024-04-09

## 2024-04-14 VITALS
HEIGHT: 67 IN | DIASTOLIC BLOOD PRESSURE: 74 MMHG | HEART RATE: 98 BPM | SYSTOLIC BLOOD PRESSURE: 113 MMHG | BODY MASS INDEX: 23.25 KG/M2 | WEIGHT: 148.13 LBS | TEMPERATURE: 98 F | OXYGEN SATURATION: 100 % | RESPIRATION RATE: 16 BRPM

## 2024-04-14 PROCEDURE — 25000003 PHARM REV CODE 250: Performed by: NURSE PRACTITIONER

## 2024-04-14 RX ORDER — CLINDAMYCIN HYDROCHLORIDE 150 MG/1
300 CAPSULE ORAL EVERY 8 HOURS
Qty: 42 CAPSULE | Refills: 0 | Status: SHIPPED | OUTPATIENT
Start: 2024-04-14 | End: 2024-04-21

## 2024-04-14 RX ORDER — HYDROCODONE BITARTRATE AND ACETAMINOPHEN 5; 325 MG/1; MG/1
1 TABLET ORAL
Status: COMPLETED | OUTPATIENT
Start: 2024-04-14 | End: 2024-04-14

## 2024-04-14 RX ORDER — HYDROCODONE BITARTRATE AND ACETAMINOPHEN 5; 325 MG/1; MG/1
1 TABLET ORAL EVERY 12 HOURS PRN
Qty: 10 TABLET | Refills: 0 | Status: SHIPPED | OUTPATIENT
Start: 2024-04-14 | End: 2024-05-08

## 2024-04-14 RX ADMIN — HYDROCODONE BITARTRATE AND ACETAMINOPHEN 1 TABLET: 5; 325 TABLET ORAL at 12:04

## 2024-04-14 NOTE — ED PROVIDER NOTES
"Encounter Date: 4/13/2024       History     Chief Complaint   Patient presents with    Dental Pain     States right lower wisdom tooth problems.      Pt is a 38 y.o. male who presents to the [facility] ED complaining of dental pain. Symptoms have been present for last few days. Admits to chronically "bad teeth" and is having difficulty finding someone to pull the teeth. Denies chest pain, SOB, weakness, dizziness, fever, trauma to mouth, or facial swelling.      Review of patient's allergies indicates:   Allergen Reactions    Aspirin Shortness Of Breath    Ibuprofen Other (See Comments)     Per patient triggers " my asthma".     Penicillins      Past Medical History:   Diagnosis Date    Anxiety     Anxiety disorder, unspecified     GERD (gastroesophageal reflux disease)     Gout, unspecified     Sciatic nerve disease      No past surgical history on file.  No family history on file.  Social History     Tobacco Use    Smoking status: Every Day     Current packs/day: 0.50     Types: Cigarettes    Smokeless tobacco: Never   Substance Use Topics    Alcohol use: Yes    Drug use: Yes     Types: Marijuana     Review of Systems   Constitutional:  Negative for chills, diaphoresis, fatigue and fever.   HENT:  Positive for dental problem. Negative for facial swelling, postnasal drip, rhinorrhea, sinus pressure, sinus pain, sore throat and trouble swallowing.    Respiratory:  Negative for cough, chest tightness, shortness of breath and wheezing.    Cardiovascular:  Negative for chest pain, palpitations and leg swelling.   Gastrointestinal:  Negative for abdominal pain, diarrhea, nausea and vomiting.   Genitourinary:  Negative for dysuria, flank pain, hematuria and urgency.   Musculoskeletal:  Negative for arthralgias, back pain and myalgias.   Skin:  Negative for color change and rash.   Neurological:  Negative for dizziness, syncope, weakness and headaches.   Hematological:  Does not bruise/bleed easily.   All other systems reviewed " and are negative.      Physical Exam     Initial Vitals [04/13/24 2259]   BP Pulse Resp Temp SpO2   113/74 98 17 98.4 °F (36.9 °C) 100 %      MAP       --         Physical Exam    Nursing note and vitals reviewed.  Constitutional: Vital signs are normal. He appears well-developed and well-nourished.   HENT:   Head: Normocephalic.   Nose: Nose normal.   Mouth/Throat: Oropharynx is clear and moist. Dental abscesses and dental caries present.   Widespread dental decay. Tenderness and swelling along Rt upper gum line.   Eyes: Conjunctivae and EOM are normal. Pupils are equal, round, and reactive to light.   Neck: Neck supple.   Normal range of motion.  Cardiovascular:  Normal rate, regular rhythm, normal heart sounds and intact distal pulses.           Pulmonary/Chest: Effort normal and breath sounds normal. No respiratory distress. He has no wheezes. He has no rhonchi. He has no rales. He exhibits no tenderness.   Abdominal: Abdomen is soft and flat. Bowel sounds are normal. There is no abdominal tenderness. There is no rebound, no guarding, no tenderness at McBurney's point and negative Ureña's sign.   Musculoskeletal:         General: Normal range of motion.      Cervical back: Normal range of motion and neck supple.     Neurological: He is alert and oriented to person, place, and time. He has normal strength.   Skin: Skin is warm and dry. Capillary refill takes less than 2 seconds.   Psychiatric: He has a normal mood and affect. His behavior is normal. Judgment and thought content normal.         ED Course   Procedures  Labs Reviewed - No data to display       Imaging Results    None          Medications   HYDROcodone-acetaminophen 5-325 mg per tablet 1 tablet (has no administration in time range)     Medical Decision Making  Differential:  Dental abscess  Dental pain    Risk  Prescription drug management.               ED Course as of 04/14/24 0008   Sun Apr 14, 2024   0006 Given strict ED return precautions. I  have spoken with the patient and/or caregivers. I have explained the patient's condition, diagnoses and treatment plan based on the information available to me at this time. I have answered the patient's and/or caregiver's questions and addressed any concerns. The patient and/or caregivers have as good an understanding of the patient's diagnosis, condition and treatment plan as can be expected at this point. The vital signs have been stable. The patient's condition is stable and appropriate for discharge from the emergency department.      The patient will pursue further outpatient evaluation with the primary care physician or other designated or consulting physician as outlined in the discharge instructions. The patient and/or caregivers are agreeable to this plan of care and follow-up instructions have been explained in detail. The patient and/or caregivers have received these instructions in written format and have expressed an understanding of the discharge instructions. The patient and/or caregivers are aware that any significant change in condition or worsening of symptoms should prompt an immediate return to this or the closest emergency department or a call to 911.   [JA]      ED Course User Index  [JA] Thong Garza Jr., FNP                           Clinical Impression:  Final diagnoses:  [K04.7] Dental infection (Primary)  [K08.89] Pain, dental          ED Disposition Condition    Discharge Stable          ED Prescriptions       Medication Sig Dispense Start Date End Date Auth. Provider    HYDROcodone-acetaminophen (NORCO) 5-325 mg per tablet Take 1 tablet by mouth every 12 (twelve) hours as needed for Pain. 10 tablet 4/14/2024 -- Thong Garza Jr., FNP    clindamycin (CLEOCIN) 150 MG capsule Take 2 capsules (300 mg total) by mouth every 8 (eight) hours. for 7 days 42 capsule 4/14/2024 4/21/2024 Thong Garza Jr., FNP          Follow-up Information       Follow up With Specialties Details Why  Contact Info    Juno Mendoza, DO Family Medicine In 3 days  [address] [facility] [address] [phone]      [facility] Emergency Dept Emergency Medicine In 3 days As needed, If symptoms worsen [address] [facility] [place] [address] [phone]             Thong Garza Jr., [facility]  04/14/24 0008

## 2024-04-14 NOTE — DISCHARGE INSTRUCTIONS
Follow up with your dentist in next 5-7 days for evaluation.  Follow up with your PCP in next 5-7 days for evaluation.  Take all prescribed medications as prescribed.

## 2024-04-19 ENCOUNTER — HOSPITAL ENCOUNTER (EMERGENCY)
Facility: HOSPITAL | Age: 39
Discharge: HOME | End: 2024-04-19
Attending: EMERGENCY MEDICINE
Payer: MEDICARE

## 2024-04-19 VITALS
HEIGHT: 67 IN | RESPIRATION RATE: 20 BRPM | DIASTOLIC BLOOD PRESSURE: 69 MMHG | WEIGHT: 160 LBS | TEMPERATURE: 98 F | SYSTOLIC BLOOD PRESSURE: 103 MMHG | OXYGEN SATURATION: 98 % | BODY MASS INDEX: 25.11 KG/M2 | HEART RATE: 100 BPM

## 2024-04-19 DIAGNOSIS — S62.346A CLOSED NONDISPLACED FRACTURE OF BASE OF FIFTH METACARPAL BONE OF RIGHT HAND, INITIAL ENCOUNTER: ICD-10-CM

## 2024-04-19 DIAGNOSIS — K08.89 PAIN, DENTAL: Primary | ICD-10-CM

## 2024-04-19 PROCEDURE — 96372 THER/PROPH/DIAG INJ SC/IM: CPT | Mod: 59 | Performed by: EMERGENCY MEDICINE

## 2024-04-19 PROCEDURE — 99284 EMERGENCY DEPT VISIT MOD MDM: CPT | Mod: 25

## 2024-04-19 PROCEDURE — 63600175 PHARM REV CODE 636 W HCPCS: Performed by: EMERGENCY MEDICINE

## 2024-04-19 PROCEDURE — 29125 APPL SHORT ARM SPLINT STATIC: CPT | Mod: RT

## 2024-04-19 RX ORDER — HYDROCODONE BITARTRATE AND ACETAMINOPHEN 5; 325 MG/1; MG/1
1 TABLET ORAL EVERY 6 HOURS PRN
Qty: 12 TABLET | Refills: 0 | Status: SHIPPED | OUTPATIENT
Start: 2024-04-19 | End: 2024-05-08

## 2024-04-19 RX ORDER — KETOROLAC TROMETHAMINE 30 MG/ML
30 INJECTION, SOLUTION INTRAMUSCULAR; INTRAVENOUS
Status: COMPLETED | OUTPATIENT
Start: 2024-04-19 | End: 2024-04-19

## 2024-04-19 RX ORDER — CHLORHEXIDINE GLUCONATE ORAL RINSE 1.2 MG/ML
SOLUTION DENTAL
Qty: 473 ML | Refills: 0 | Status: SHIPPED | OUTPATIENT
Start: 2024-04-19 | End: 2024-05-08

## 2024-04-19 RX ADMIN — KETOROLAC TROMETHAMINE 30 MG: 30 INJECTION, SOLUTION INTRAMUSCULAR at 09:04

## 2024-04-19 NOTE — Clinical Note
"Venu Prozaid Walker was seen and treated in our emergency department on 4/19/2024.  He may return to work on 04/24/2024.       If you have any questions or concerns, please don't hesitate to call.      Eunice Robbins MD"

## 2024-04-20 NOTE — ED PROVIDER NOTES
"Encounter Date: 4/19/2024       History     Chief Complaint   Patient presents with    Dental Pain    Hand Injury     Pt to er with dental/facial pain. Pt states that pain was so bad that he punched a door, also has right hand pain      38-year-old male complains of ongoing dental pain, stating that he is trying to get an appointment with an oral surgeon to remove multiple teeth.  He has seen a dentist but the dentist says it needs to be done by an oral surgeon so he is waiting on insurance company to help him get an oral surgeon.  He states his dental pain has become so severe he got upset and punched a wall and now he has right hand pain.  He is currently taking clindamycin for his teeth        Review of patient's allergies indicates:   Allergen Reactions    Aspirin Shortness Of Breath    Ibuprofen Other (See Comments)     Per patient triggers " my asthma".     Penicillins      Past Medical History:   Diagnosis Date    Anxiety     Anxiety disorder, unspecified     GERD (gastroesophageal reflux disease)     Gout, unspecified     Sciatic nerve disease      No past surgical history on file.  No family history on file.  Social History     Tobacco Use    Smoking status: Every Day     Current packs/day: 0.50     Types: Cigarettes    Smokeless tobacco: Never   Substance Use Topics    Alcohol use: Yes    Drug use: Yes     Types: Marijuana     Review of Systems   HENT:  Positive for dental problem.    Musculoskeletal:  Positive for arthralgias.   All other systems reviewed and are negative.      Physical Exam     Initial Vitals [04/19/24 2108]   BP Pulse Resp Temp SpO2   103/69 100 20 98.1 °F (36.7 °C) 98 %      MAP       --         Physical Exam    Nursing note and vitals reviewed.  Constitutional: He appears well-developed and well-nourished.   HENT:   Extensive dental caries throughout his mouth with most teeth broken completely to the gums, gum erythema noted throughout with no visible drainable abscess.  No swelling " noted to the face   Neck: Neck supple.   Normal range of motion.  Pulmonary/Chest: No respiratory distress.   Musculoskeletal:      Cervical back: Normal range of motion and neck supple.      Comments: Right hand has bruising and swelling over the 4th and 5th metacarpal region with no laceration or abrasion.  Full range of motion of all joints although he has pain to his hand when he makes a fist.  Radial pulses 2+.  No tenderness to the wrist, forearm, elbow.     Lymphadenopathy:     He has no cervical adenopathy.   Neurological: He is alert and oriented to person, place, and time.         ED Course   Splint Application    Date/Time: 4/20/2024 12:03 AM    Performed by: Eunice Robbins MD  Authorized by: Eunice Robbins MD  Consent Done: Yes  Consent: Verbal consent obtained.  Risks and benefits: risks, benefits and alternatives were discussed  Consent given by: patient  Patient understanding: patient states understanding of the procedure being performed  Patient identity confirmed: verbally with patient  Location details: right hand  Splint type: ulnar gutter  Supplies used: Ortho-Glass  Post-procedure: The splinted body part was neurovascularly unchanged following the procedure.  Patient tolerance: Patient tolerated the procedure well with no immediate complications        Labs Reviewed - No data to display       Imaging Results              X-Ray Hand 3 view Right (In process)                   X-Rays:   Independently Interpreted Readings:   Other Readings:  Preliminary reading of the right hand x-ray is proximal 5th metacarpal fracture nondisplaced    Medications   ketorolac injection 30 mg (30 mg Intramuscular Given 4/19/24 2131)     Medical Decision Making  See HPI for narrative    Differential diagnosis includes but is not limited to dental caries, dental abscess, hand contusion, hand fracture    Amount and/or Complexity of Data Reviewed  Radiology: ordered.  Discussion of management or test  interpretation with external provider(s): Patient was seen and evaluated in the Emergency Department with history, physical exam, chart review and x-ray of his hand.  He was noted to have a fracture of his hand and it was splinted, pain medication prescribed.  I discussed with him the pros, cons, adverse effect, addictive potential of narcotic pain medications and he still wants the prescription.  Follow-up will be requested at [facility] regarding his hand.  For his extensive dental caries, I did give him a list of dental clinics and he states he will be calling his insurance company on Monday to try to find an oral surgeon.  He is stable for discharge home and all questions were answered.    Risk  Prescription drug management.                                      Clinical Impression:  Final diagnoses:  [K08.89] Pain, dental (Primary)  [S62.346A] Closed nondisplaced fracture of base of fifth metacarpal bone of right hand, initial encounter          ED Disposition Condition    Discharge Stable          ED Prescriptions       Medication Sig Dispense Start Date End Date Auth. Provider    HYDROcodone-acetaminophen (NORCO) 5-325 mg per tablet Take 1 tablet by mouth every 6 (six) hours as needed for Pain. 12 tablet 4/19/2024 -- Eunice Robbins MD    chlorhexidine (PERIDEX) 0.12 % solution Swish and spit 15ml twice daily after brushing.  Do not swallow this product 473 mL 4/19/2024 -- Eunice Robbins MD          Follow-up Information       Follow up With Specialties Details Why Contact Info    [facility]   Follow up requested              Eunice Robbins MD  04/20/24 0005

## 2024-05-03 ENCOUNTER — TELEPHONE (OUTPATIENT)
Dept: ORTHOPEDICS | Facility: CLINIC | Age: 39
End: 2024-05-03
Payer: MEDICARE

## 2024-05-03 NOTE — TELEPHONE ENCOUNTER
I spoke to Josefa at [facility] and had her move the patient to be seen at [facility] since he went to [facility] ER.     I called the patient and informed him. Patient agreed.

## 2024-05-08 ENCOUNTER — OFFICE VISIT (OUTPATIENT)
Dept: ORTHOPEDICS | Facility: CLINIC | Age: 39
End: 2024-05-08
Payer: MEDICARE

## 2024-05-08 ENCOUNTER — HOSPITAL ENCOUNTER (OUTPATIENT)
Dept: RADIOLOGY | Facility: CLINIC | Age: 39
Discharge: HOME | End: 2024-05-08
Attending: STUDENT IN AN ORGANIZED HEALTH CARE EDUCATION/TRAINING PROGRAM
Payer: MEDICARE

## 2024-05-08 VITALS
HEART RATE: 80 BPM | DIASTOLIC BLOOD PRESSURE: 67 MMHG | BODY MASS INDEX: 25.12 KG/M2 | SYSTOLIC BLOOD PRESSURE: 100 MMHG | HEIGHT: 67 IN | WEIGHT: 160.06 LBS

## 2024-05-08 DIAGNOSIS — S62.346A CLOSED NONDISPLACED FRACTURE OF BASE OF FIFTH METACARPAL BONE OF RIGHT HAND, INITIAL ENCOUNTER: Primary | ICD-10-CM

## 2024-05-08 DIAGNOSIS — S69.91XA INJURY OF RIGHT HAND, INITIAL ENCOUNTER: ICD-10-CM

## 2024-05-08 PROCEDURE — 3078F DIAST BP <80 MM HG: CPT | Mod: CPTII,,, | Performed by: STUDENT IN AN ORGANIZED HEALTH CARE EDUCATION/TRAINING PROGRAM

## 2024-05-08 PROCEDURE — 99203 OFFICE O/P NEW LOW 30 MIN: CPT | Mod: ,,, | Performed by: STUDENT IN AN ORGANIZED HEALTH CARE EDUCATION/TRAINING PROGRAM

## 2024-05-08 PROCEDURE — 1159F MED LIST DOCD IN RCRD: CPT | Mod: CPTII,,, | Performed by: STUDENT IN AN ORGANIZED HEALTH CARE EDUCATION/TRAINING PROGRAM

## 2024-05-08 PROCEDURE — 3074F SYST BP LT 130 MM HG: CPT | Mod: CPTII,,, | Performed by: STUDENT IN AN ORGANIZED HEALTH CARE EDUCATION/TRAINING PROGRAM

## 2024-05-08 PROCEDURE — 3008F BODY MASS INDEX DOCD: CPT | Mod: CPTII,,, | Performed by: STUDENT IN AN ORGANIZED HEALTH CARE EDUCATION/TRAINING PROGRAM

## 2024-05-08 PROCEDURE — 73130 X-RAY EXAM OF HAND: CPT | Mod: RT,,, | Performed by: STUDENT IN AN ORGANIZED HEALTH CARE EDUCATION/TRAINING PROGRAM

## 2024-05-08 NOTE — PROGRESS NOTES
"Chief Complaint:  Right hand injury    Consulting Physician: No ref. provider found    History of present illness:    Patient is a 38-year-old male who presents for initial evaluation of a right hand injury that he sustained on 04/19/2024 when he punched a door.  He was seen in the [facility] emergency department was placed into a splint.  He has been immobilized since the date of injury.  He has not been moving his fingers.  Her fingers are stiff.  He has pain localized over the ulnar aspect of the hand.  He has not been doing any therapy for this.  He also complains of left knee pain.    Past Medical History:   Diagnosis Date    Anxiety     Anxiety disorder, unspecified     GERD (gastroesophageal reflux disease)     Gout, unspecified     Sciatic nerve disease        History reviewed. No pertinent surgical history.    Current Outpatient Medications   Medication Sig    ADVAIR -21 mcg/actuation HFAA inhaler Inhale 2 puffs into the lungs 2 (two) times daily.    albuterol (PROVENTIL) 2.5 mg /3 mL (0.083 %) nebulizer solution USE 1 VIAL VIA NEBULIZER 3 TIMES A DAY    buPROPion (WELLBUTRIN SR) 150 MG TBSR 12 hr tablet Take 150 mg by mouth.    chlorhexidine (PERIDEX) 0.12 % solution Use as directed 15 mLs in the mouth or throat 2 (two) times daily.    clonazePAM (KLONOPIN) 1 MG disintegrating tablet Take 1 mg by mouth.    FLOVENT  mcg/actuation inhaler TAKE 1 PUFF BY MOUTH TWICE A DAY    pantoprazole (PROTONIX) 20 MG tablet     sertraline (ZOLOFT) 100 MG tablet Take 100 mg by mouth once daily.    VRAYLAR 1.5 mg Cap Take 1.5 mg by mouth once daily.     No current facility-administered medications for this visit.       Review of patient's allergies indicates:   Allergen Reactions    Aspirin Shortness Of Breath    Ibuprofen Other (See Comments)     Per patient triggers " my asthma".     Penicillins        No family history on file.    Social History     Socioeconomic History    Marital status: Single   Tobacco Use    " "Smoking status: Every Day     Current packs/day: 0.50     Types: Cigarettes    Smokeless tobacco: Never   Substance and Sexual Activity    Alcohol use: Yes    Drug use: Yes     Types: Marijuana       Review of Systems:    Constitution:   Denies chills, fever, and sweats.  HENT:   Denies headaches or blurry vision.  Cardiovascular:  Denies chest pain or irregular heart beat.  Respiratory:   Denies cough or shortness of breath.  Gastrointestinal:  Denies abdominal pain, nausea, or vomiting.  Musculoskeletal:   Denies muscle cramps.  Neurological:   Denies dizziness or focal weakness.  Psychiatric/Behavior: Normal mental status.  Hematology/Lymph:  Denies bleeding problem or easy bruising/bleeding.  Skin:    Denies rash or suspicious lesions.    Examination:    Vital Signs:    Vitals:    05/08/24 0849   BP: 100/67   Pulse: 80   Weight: 72.6 kg (160 lb 0.9 oz)   Height: 5' 7" (1.702 m)       Body mass index is 25.07 kg/m².    Constitution:   Well-developed, well nourished patient in no acute distress.  Neurological:   Alert and oriented x 3 and cooperative to examination.     Psychiatric/Behavior: Normal mental status.  Respiratory:   No shortness of breath.  Eyes:    Extraoccular muscles intact  Skin:    No scars, rash or suspicious lesions.    MSK:   Right hand:  No open wounds or rashes.  Tenderness to palpation over the 5th metacarpal.  Digits are stiff.  He can only flex to 5 cm away from distal palmar crease.  There does not appear to be rotational deformity of the 5th finger sensation light touch intact in median ulnar radial distribution.  Radial pulse 2 +hand is warm well perfused    Imaging:   X-ray of the right hand shows minimally displaced 5th metacarpal fracture.  The CMC joint is congruent     Assessment:  Right 5th metacarpal base fracture    Plan:  This can be treated non operatively.  He is now 3 weeks out from this.  I will get him into a Velcro wrist splint and get him started with some hand therapy. "  He can remove the wrist splint for digit range of motion.  No lifting pushing pulling of the next 3 weeks with the right hand.  I will see him back in 3-4 weeks to see how he is doing.  Will refer him to my sports partner to evaluate his left knee    Follow Up:  4 weeks  Xray at next visit:  Right hand

## 2024-05-15 ENCOUNTER — HOSPITAL ENCOUNTER (OUTPATIENT)
Dept: RADIOLOGY | Facility: CLINIC | Age: 39
Discharge: HOME | End: 2024-05-15
Attending: REHABILITATION UNIT
Payer: MEDICARE

## 2024-05-15 ENCOUNTER — OFFICE VISIT (OUTPATIENT)
Dept: ORTHOPEDICS | Facility: CLINIC | Age: 39
End: 2024-05-15
Payer: MEDICARE

## 2024-05-15 VITALS
HEART RATE: 99 BPM | WEIGHT: 150.63 LBS | BODY MASS INDEX: 23.64 KG/M2 | SYSTOLIC BLOOD PRESSURE: 117 MMHG | DIASTOLIC BLOOD PRESSURE: 73 MMHG | HEIGHT: 67 IN

## 2024-05-15 DIAGNOSIS — G89.29 BILATERAL CHRONIC KNEE PAIN: Primary | ICD-10-CM

## 2024-05-15 DIAGNOSIS — M25.561 BILATERAL CHRONIC KNEE PAIN: Primary | ICD-10-CM

## 2024-05-15 DIAGNOSIS — M25.562 BILATERAL CHRONIC KNEE PAIN: Primary | ICD-10-CM

## 2024-05-15 DIAGNOSIS — M25.561 PAIN IN BOTH KNEES, UNSPECIFIED CHRONICITY: ICD-10-CM

## 2024-05-15 DIAGNOSIS — M25.562 PAIN IN BOTH KNEES, UNSPECIFIED CHRONICITY: ICD-10-CM

## 2024-05-15 PROCEDURE — 3008F BODY MASS INDEX DOCD: CPT | Mod: CPTII,,, | Performed by: REHABILITATION UNIT

## 2024-05-15 PROCEDURE — 3074F SYST BP LT 130 MM HG: CPT | Mod: CPTII,,, | Performed by: REHABILITATION UNIT

## 2024-05-15 PROCEDURE — 3078F DIAST BP <80 MM HG: CPT | Mod: CPTII,,, | Performed by: REHABILITATION UNIT

## 2024-05-15 PROCEDURE — 73564 X-RAY EXAM KNEE 4 OR MORE: CPT | Mod: 50,,, | Performed by: REHABILITATION UNIT

## 2024-05-15 PROCEDURE — 99203 OFFICE O/P NEW LOW 30 MIN: CPT | Mod: ,,, | Performed by: REHABILITATION UNIT

## 2024-05-15 NOTE — PROGRESS NOTES
Subjective:      Patient ID: Venu Walker is a 38 y.o. male.    Chief Complaint: Pain of the Left Knee (Left knee pain, ref from Dr. Sandy, haven't seen someone in years, Dr. Feng was seeing in at [facility] until insurance didn't approve, locks up on him not as much bc putting more weight on the right knee and that one pops, dx with nerve and muscle disorder)    HPI:   Venu Walker is a 38 y.o. male who presents today for initial evaluation of his bilateral knees    History of Present Illness  The patient presents for evaluation of bilateral knee pain.    The patient recounts a significant knee injury sustained several years ago, during which he sustained ligament injuries in his left knee. Since then, he has experienced persistent issues with his left leg. He reports a loss of balance, with complete loss of sensation in his foot when stepping on an object, leading to falls. He also mentions two instances of patellar dislocation in his right knee. Despite initial treatment from [facility] and subsequent insurance alterations, he has been unable to secure a new orthopedic provider. He has not undergone any radiographic imaging or MRI for his left knee. He initiated physical therapy two weeks post-injury, but due to insurance constraints, his therapist was unable to continue this treatment. Subsequently, he sustained a back injury, which led to a consultation with a nerve specialist. The nerve specialist diagnosed him with nerve disease and muscle weakness, likely indicative of neuropathy. However, no discussions regarding the muscle weakness were raised, and the patient has begun to perceive these changes. He is not currently on any medication for his knees. He denies any knee swelling, but reports a sensation of bone shifting when he steps onto a curve. Another provider had previously recommended surgery for his back, but the patient declined.      Past Medical History:   Diagnosis Date    Anxiety      "Anxiety disorder, unspecified     GERD (gastroesophageal reflux disease)     Gout, unspecified     Sciatic nerve disease      History reviewed. No pertinent surgical history.  Social History     Socioeconomic History    Marital status: Single   Tobacco Use    Smoking status: Every Day     Current packs/day: 0.50     Types: Cigarettes    Smokeless tobacco: Never   Substance and Sexual Activity    Alcohol use: Yes    Drug use: Yes     Types: Marijuana         Current Outpatient Medications:     ADVAIR -21 mcg/actuation HFAA inhaler, Inhale 2 puffs into the lungs 2 (two) times daily., Disp: , Rfl:     albuterol (PROVENTIL) 2.5 mg /3 mL (0.083 %) nebulizer solution, USE 1 VIAL VIA NEBULIZER 3 TIMES A DAY, Disp: , Rfl:     buPROPion (WELLBUTRIN SR) 150 MG TBSR 12 hr tablet, Take 150 mg by mouth., Disp: , Rfl:     chlorhexidine (PERIDEX) 0.12 % solution, Use as directed 15 mLs in the mouth or throat 2 (two) times daily., Disp: 473 mL, Rfl: 0    clonazePAM (KLONOPIN) 1 MG disintegrating tablet, Take 1 mg by mouth., Disp: , Rfl:     FLOVENT  mcg/actuation inhaler, TAKE 1 PUFF BY MOUTH TWICE A DAY, Disp: , Rfl:     pantoprazole (PROTONIX) 20 MG tablet, , Disp: , Rfl:     sertraline (ZOLOFT) 100 MG tablet, Take 100 mg by mouth once daily., Disp: , Rfl:     VRAYLAR 1.5 mg Cap, Take 1.5 mg by mouth once daily., Disp: , Rfl:   Review of patient's allergies indicates:   Allergen Reactions    Aspirin Shortness Of Breath    Ibuprofen Other (See Comments)     Per patient triggers " my asthma".     Penicillins        /73 (BP Location: Left arm, Patient Position: Sitting, BP Method: Medium (Automatic))   Pulse 99   Ht 5' 7" (1.702 m)   Wt 68.3 kg (150 lb 9.6 oz)   BMI 23.59 kg/m²     Comprehensive review of systems completed and negative except as per HPI.        Objective:   Head: Normocephalic, without obvious abnormality, atraumatic  Eyes: conjunctivae/corneas clear. EOM's intact  Ears: normal external " appearance  Nose: Nares normal. Septum midline. Mucosa normal. No drainage  Throat: normal findings: lips normal without lesions  Lungs: unlabored breathing on room air  Chest wall: symmetric chest rise  Heart: regular rate and rhythm  Pulses: 2+ and symmetric  Skin: Skin color, texture, turgor normal. No rashes or lesions  Neurologic: Grossly normal    bilateral KNEE:     Alignment: neutral  Appearance: skin in intact without lesion  Effusion: none  Gait: wnl  Straight Leg Raise: negative  Log Roll: negative  Hip ROM: full and painless  Ankle ROM: full and painless   Knee ROM:  0 - 125  Tenderness: no discrete TTP    Patellar Mobility: normal  Patellar grind: negative  J Sign: negative  PF Crepitus: negative        Shirley Test: negative   Valgus Stress @ 0 deg: stable  Valgus Stress @ 30 deg: stable  Varus Stress @ 0 deg: stable  Varus Stress @ 30 deg: stable  Lachman: stable  Ant Drawer: negative   Post Drawer: negative  Posterior Sag Sign: negative  Neurological deficits: SILT dp/sp/t distributions  Motor: 5/5 EHL/FHL/TA/GS    Warm well perfused lower extremity with capillary refill less than 2 seconds and sensation intact to light touch in the terminal nerve distributions. Calf soft and easily compressible without clinical sign of DVT. No palpable popliteal lymphadenopathy    Assessment:     Imaging:   Four view weight-bearing radiographs of the bilateral knees obtained today personally reviewed showing no acute fractures or dislocations.  Visualized joint spaces are intact.  There is some calcification along the proximal MCL origin.        1. Pain in both knees, unspecified chronicity          Plan:       Orders Placed This Encounter    X-Ray Knee Complete 4 Or More Views Bilat        Assessment & Plan  1. Bilateral knee pain.  Imaging and exam findings discussed.  Upon examination, the patient's ligamentous examination yielded normal results. A referral for physical therapy has been initiated to strengthen  the knees. [facility] [place]. Should the patient's knee continue to exhibit instability, an MRI of the knee will be considered for further evaluation.  Continue symptomatic management.  [place].  Avoid aggravating activities.    Follow-up  The patient is scheduled for a follow-up visit in around 8 weeks

## 2024-06-05 ENCOUNTER — HOSPITAL ENCOUNTER (OUTPATIENT)
Dept: RADIOLOGY | Facility: CLINIC | Age: 39
Discharge: HOME | End: 2024-06-05
Attending: STUDENT IN AN ORGANIZED HEALTH CARE EDUCATION/TRAINING PROGRAM
Payer: MEDICARE

## 2024-06-05 ENCOUNTER — HOSPITAL ENCOUNTER (EMERGENCY)
Facility: HOSPITAL | Age: 39
Discharge: HOME | End: 2024-06-05
Attending: EMERGENCY MEDICINE
Payer: MEDICARE

## 2024-06-05 ENCOUNTER — OFFICE VISIT (OUTPATIENT)
Dept: ORTHOPEDICS | Facility: CLINIC | Age: 39
End: 2024-06-05
Payer: MEDICARE

## 2024-06-05 VITALS
RESPIRATION RATE: 18 BRPM | OXYGEN SATURATION: 99 % | HEART RATE: 88 BPM | HEIGHT: 67 IN | DIASTOLIC BLOOD PRESSURE: 85 MMHG | BODY MASS INDEX: 24.33 KG/M2 | SYSTOLIC BLOOD PRESSURE: 128 MMHG | TEMPERATURE: 99 F | WEIGHT: 155 LBS

## 2024-06-05 VITALS
SYSTOLIC BLOOD PRESSURE: 107 MMHG | WEIGHT: 155 LBS | BODY MASS INDEX: 24.33 KG/M2 | DIASTOLIC BLOOD PRESSURE: 74 MMHG | HEIGHT: 67 IN | HEART RATE: 82 BPM

## 2024-06-05 DIAGNOSIS — S62.346A CLOSED NONDISPLACED FRACTURE OF BASE OF FIFTH METACARPAL BONE OF RIGHT HAND, INITIAL ENCOUNTER: ICD-10-CM

## 2024-06-05 DIAGNOSIS — K08.89 DENTALGIA: ICD-10-CM

## 2024-06-05 DIAGNOSIS — S62.346A CLOSED NONDISPLACED FRACTURE OF BASE OF FIFTH METACARPAL BONE OF RIGHT HAND, INITIAL ENCOUNTER: Primary | ICD-10-CM

## 2024-06-05 DIAGNOSIS — K02.9 PAIN DUE TO DENTAL CARIES: Primary | ICD-10-CM

## 2024-06-05 PROCEDURE — 1159F MED LIST DOCD IN RCRD: CPT | Mod: CPTII,,, | Performed by: STUDENT IN AN ORGANIZED HEALTH CARE EDUCATION/TRAINING PROGRAM

## 2024-06-05 PROCEDURE — 3074F SYST BP LT 130 MM HG: CPT | Mod: CPTII,,, | Performed by: STUDENT IN AN ORGANIZED HEALTH CARE EDUCATION/TRAINING PROGRAM

## 2024-06-05 PROCEDURE — 99284 EMERGENCY DEPT VISIT MOD MDM: CPT

## 2024-06-05 PROCEDURE — 99213 OFFICE O/P EST LOW 20 MIN: CPT | Mod: ,,, | Performed by: STUDENT IN AN ORGANIZED HEALTH CARE EDUCATION/TRAINING PROGRAM

## 2024-06-05 PROCEDURE — 73130 X-RAY EXAM OF HAND: CPT | Mod: RT,,, | Performed by: STUDENT IN AN ORGANIZED HEALTH CARE EDUCATION/TRAINING PROGRAM

## 2024-06-05 PROCEDURE — 3078F DIAST BP <80 MM HG: CPT | Mod: CPTII,,, | Performed by: STUDENT IN AN ORGANIZED HEALTH CARE EDUCATION/TRAINING PROGRAM

## 2024-06-05 PROCEDURE — 3008F BODY MASS INDEX DOCD: CPT | Mod: CPTII,,, | Performed by: STUDENT IN AN ORGANIZED HEALTH CARE EDUCATION/TRAINING PROGRAM

## 2024-06-05 RX ORDER — CLINDAMYCIN HYDROCHLORIDE 300 MG/1
300 CAPSULE ORAL EVERY 8 HOURS
Qty: 30 CAPSULE | Refills: 0 | Status: SHIPPED | OUTPATIENT
Start: 2024-06-05 | End: 2024-06-15

## 2024-06-05 RX ORDER — CHLORHEXIDINE GLUCONATE ORAL RINSE 1.2 MG/ML
15 SOLUTION DENTAL 2 TIMES DAILY
Qty: 420 ML | Refills: 0 | Status: SHIPPED | OUTPATIENT
Start: 2024-06-05 | End: 2024-06-19

## 2024-06-05 RX ORDER — TRAMADOL HYDROCHLORIDE 50 MG/1
50 TABLET ORAL EVERY 6 HOURS PRN
Qty: 12 TABLET | Refills: 0 | Status: SHIPPED | OUTPATIENT
Start: 2024-06-05 | End: 2024-06-08

## 2024-06-05 NOTE — DISCHARGE INSTRUCTIONS
CLINIC ADDRESS PHONE # INSURANCE   [facility] [address] [place] [address] [phone] Medicaid/Medicare   Dr. Juan Bernal, DDS  [address]  [place] [address] [phone] Medicaid   [facility] [address] [address] [phone] Medicaid   [facility] [address] [place] [address] [phone] Medicare [facility]  [address] [place] [address] [phone] Medicaid/Medicare   [facility] [address]  [place], [place] [id number] [phone] Medicaid   [facility]  [address]  [place], [place] [address] [phone] Medicaid for ages 5 and under; or for lip/tongue tie    Dr. Andrew Medeiros, DDS [address] [facility] [address] [place] [address] [phone] Medicaid-only for children ages 2 to 21   [facility] [address]  [place] [address] [phone] Medicaid   [facility] [address] [place] [address] [phone] Medicare [facility] [address] [place] [address] [phone] Medicaid   [facility] [address] [place] [address] [phone] Medicaid-Pediatric dentistry    [facility] [address] [place] [address] [phone] Medicaid/Medicare   [facility] [address] [place] [address] [phone] Medicaid/Medicare   [facility] [address]  [place] [address] [phone] Medicaid/Medicare   [facility] [address] [place] [address] [phone] Medicaid/Medicare   [facility] [address] [place] [address] [phone] Medicaid/Medicare   [facility] [address] [place] [address] [phone] Medicaid-Pediatric and adult   [facility] [address]  [place] [address] [phone] Medicaid   [facility] [address]  [place], [place] [id number] [phone] Medicare   Dr. Zaida Lea,  [place] [address] [phone] Medicare for dentures only       independent

## 2024-06-05 NOTE — ED PROVIDER NOTES
"Encounter Date: 6/5/2024       History     Chief Complaint   Patient presents with    Dental Pain     Reports abscess in mouth drom dental carries     The patient is a 38 y.o. male who presents to the Emergency Department with a chief complaint of upper dental pain. Patient states he was told that he had to pay $18,000 before he could have surgery to have all his teeth removed. Symptoms began yesterday and have been constant since onset. His pain is currently rated as a 8/10 in severity and described as aching with no radiation. Associated symptoms include nothing. Symptoms are aggravated with nothing and there are no alleviating factors. The patient denies trismus, fever, or chills. He reports taking nothing prior to arrival with no relief of symptoms. No other reported symptoms at this time     The history is provided by the patient. No  was used.   Dental Pain  The primary symptoms include mouth pain. Primary symptoms do not include fever. The symptoms began several weeks ago. The symptoms are waxing and waning. The symptoms are chronic. The symptoms occur intermittently.   Mouth pain began more than 1 month ago. Mouth pain occurs intermittently. Mouth pain is unchanged. Affected locations include: teeth. At its highest the mouth pain was at 10/10. The mouth pain is currently at 8/10.   Additional symptoms include: gum tenderness. Additional symptoms do not include: dental sensitivity to temperature, trismus, jaw pain and facial swelling.     Review of patient's allergies indicates:   Allergen Reactions    Aspirin Shortness Of Breath    Ibuprofen Other (See Comments)     Per patient triggers " my asthma".     Penicillins      Past Medical History:   Diagnosis Date    Anxiety     Anxiety disorder, unspecified     GERD (gastroesophageal reflux disease)     Gout, unspecified     Sciatic nerve disease      History reviewed. No pertinent surgical history.  No family history on file.  Social History "     Tobacco Use    Smoking status: Every Day     Current packs/day: 0.50     Types: Cigarettes    Smokeless tobacco: Never   Substance Use Topics    Alcohol use: Yes    Drug use: Yes     Types: Marijuana     Review of Systems   Constitutional:  Negative for chills and fever.   HENT:  Positive for dental problem. Negative for facial swelling.    Hematological:  Negative for adenopathy. Does not bruise/bleed easily.   All other systems reviewed and are negative.      Physical Exam     Initial Vitals [06/05/24 1240]   BP Pulse Resp Temp SpO2   128/85 88 18 98.5 °F (36.9 °C) 99 %      MAP       --         Physical Exam    Nursing note and vitals reviewed.  Constitutional: Vital signs are normal. He appears well-developed and well-nourished.   HENT:   Head: Normocephalic.   Right Ear: Hearing and tympanic membrane normal.   Left Ear: Hearing and tympanic membrane normal.   Mouth/Throat: Uvula is midline, oropharynx is clear and moist and mucous membranes are normal. Abnormal dentition. Dental caries present.   Poor dentition throughout mouth    Eyes: Conjunctivae and EOM are normal. Pupils are equal, round, and reactive to light.   Cardiovascular:  Normal rate, regular rhythm, normal heart sounds and normal pulses.           Pulmonary/Chest: Effort normal and breath sounds normal.   Musculoskeletal:      Cervical back: No spinous process tenderness or muscular tenderness.     Lymphadenopathy:     He has no cervical adenopathy.   Neurological: He is alert. GCS eye subscore is 4. GCS verbal subscore is 5. GCS motor subscore is 6.   Skin: Skin is warm, dry and intact. Capillary refill takes less than 2 seconds.         ED Course   Procedures  Labs Reviewed - No data to display       Imaging Results    None          Medications - No data to display  Medical Decision Making  The patient is a 38 y.o. male who presents to the Emergency Department with a chief complaint of upper dental pain. Patient states he was told that he  had to pay $18,000 before he could have surgery to have all his teeth removed. Symptoms began yesterday and have been constant since onset. His pain is currently rated as a 8/10 in severity and described as aching with no radiation. Associated symptoms include nothing. Symptoms are aggravated with nothing and there are no alleviating factors. The patient denies trismus, fever, or chills. He reports taking nothing prior to arrival with no relief of symptoms. No other reported symptoms at this time     Differential diagnosis include but are not limited to dental caries, dental abscess, dentalgia                                       Clinical Impression:  Final diagnoses:  [K02.9] Pain due to dental caries (Primary)  [K08.89] Dentalgia          ED Disposition Condition    Discharge Stable          ED Prescriptions       Medication Sig Dispense Start Date End Date Auth. Provider    clindamycin (CLEOCIN) 300 MG capsule Take 1 capsule (300 mg total) by mouth every 8 (eight) hours. for 10 days 30 capsule 6/5/2024 6/15/2024 Selena Gonzales NP    chlorhexidine (PERIDEX) 0.12 % solution Use as directed 15 mLs in the mouth or throat 2 (two) times daily. for 14 days 420 mL 6/5/2024 6/19/2024 Selena Gonzales NP    traMADoL (ULTRAM) 50 mg tablet Take 1 tablet (50 mg total) by mouth every 6 (six) hours as needed. 12 tablet 6/5/2024 6/8/2024 Selena Gonzales NP          Follow-up Information       Follow up With Specialties Details Why Contact Info    Juno Mendoza,  Family Medicine Schedule an appointment as soon as possible for a visit   [address] [facility] [address]  [phone]               Selena Gonzales NP  06/05/24 6144

## 2024-06-05 NOTE — Clinical Note
"Venu Hartmann" Aaron was seen and treated in our emergency department on 6/5/2024.  He may return to work on 06/06/2024.       If you have any questions or concerns, please don't hesitate to call.      MANPREET WATTS    "

## 2024-06-06 NOTE — PROGRESS NOTES
Chief Complaint:  Right hand injury    Consulting Physician: No ref. provider found    History of present illness:    Patient is a 38-year-old male who presents for follow up evaluation of a right hand injury that he sustained on 04/19/2024 when he punched a door.  He was seen in the [facility] emergency department was placed into a splint.  I saw him in clinic last time where I diagnosed him with a 5th metacarpal fracture and treated him nonoperatively in a brace.  He would some stiffness so I will also send him to therapy.  He has been working with therapy twice a week.  He has regained his motion.  He has no pain today  Past Medical History:   Diagnosis Date    Anxiety     Anxiety disorder, unspecified     GERD (gastroesophageal reflux disease)     Gout, unspecified     Sciatic nerve disease        History reviewed. No pertinent surgical history.    Current Outpatient Medications   Medication Sig    ADVAIR -21 mcg/actuation HFAA inhaler Inhale 2 puffs into the lungs 2 (two) times daily.    albuterol (PROVENTIL) 2.5 mg /3 mL (0.083 %) nebulizer solution USE 1 VIAL VIA NEBULIZER 3 TIMES A DAY    buPROPion (WELLBUTRIN SR) 150 MG TBSR 12 hr tablet Take 150 mg by mouth.    chlorhexidine (PERIDEX) 0.12 % solution Use as directed 15 mLs in the mouth or throat 2 (two) times daily. for 14 days    clindamycin (CLEOCIN) 300 MG capsule Take 1 capsule (300 mg total) by mouth every 8 (eight) hours. for 10 days    clonazePAM (KLONOPIN) 1 MG disintegrating tablet Take 1 mg by mouth.    FLOVENT  mcg/actuation inhaler TAKE 1 PUFF BY MOUTH TWICE A DAY    pantoprazole (PROTONIX) 20 MG tablet     sertraline (ZOLOFT) 100 MG tablet Take 100 mg by mouth once daily.    traMADoL (ULTRAM) 50 mg tablet Take 1 tablet (50 mg total) by mouth every 6 (six) hours as needed.    VRAYLAR 1.5 mg Cap Take 1.5 mg by mouth once daily.     No current facility-administered medications for this visit.       Review of patient's allergies indicates:  "  Allergen Reactions    Aspirin Shortness Of Breath    Ibuprofen Other (See Comments)     Per patient triggers " my asthma".     Penicillins        No family history on file.    Social History     Socioeconomic History    Marital status: Single   Tobacco Use    Smoking status: Every Day     Current packs/day: 0.50     Types: Cigarettes    Smokeless tobacco: Never   Substance and Sexual Activity    Alcohol use: Yes    Drug use: Yes     Types: Marijuana       Review of Systems:    Constitution:   Denies chills, fever, and sweats.  HENT:   Denies headaches or blurry vision.  Cardiovascular:  Denies chest pain or irregular heart beat.  Respiratory:   Denies cough or shortness of breath.  Gastrointestinal:  Denies abdominal pain, nausea, or vomiting.  Musculoskeletal:   Denies muscle cramps.  Neurological:   Denies dizziness or focal weakness.  Psychiatric/Behavior: Normal mental status.  Hematology/Lymph:  Denies bleeding problem or easy bruising/bleeding.  Skin:    Denies rash or suspicious lesions.    Examination:    Vital Signs:    Vitals:    06/05/24 1503   BP: 107/74   Pulse: 82   Weight: 70.3 kg (155 lb)   Height: 5' 7" (1.702 m)   PainSc:   3       Body mass index is 24.28 kg/m².    Constitution:   Well-developed, well nourished patient in no acute distress.  Neurological:   Alert and oriented x 3 and cooperative to examination.     Psychiatric/Behavior: Normal mental status.  Respiratory:   No shortness of breath.  Eyes:    Extraoccular muscles intact  Skin:    No scars, rash or suspicious lesions.    MSK:   Right hand:  No open wounds or rashes.  No tenderness to palpation over the 5th metacarpal.  He can now flex the distal palmar crease with all his digits and extend his digits fully..  There has no rotational deformity of the 5th finger.  sensation light touch intact in median ulnar radial distribution.  Radial pulse 2 +hand is warm well perfused    Imaging:   X-ray of the right hand shows minimally " displaced 5th metacarpal fracture.  The CMC joint is congruent     Assessment:  Right 5th metacarpal base fracture    Plan:  He has now healed and regained his motion.  He may discontinue therapy whenever he feels like he has finished strengthening.  He has no restrictions from my standpoint and can resume full activities as tolerated.  He can follow up with me as needed    Follow Up:  As needed  Xray at next visit:  Right hand

## 2024-08-23 ENCOUNTER — HOSPITAL ENCOUNTER (EMERGENCY)
Facility: HOSPITAL | Age: 39
Discharge: HOME | End: 2024-08-23
Attending: EMERGENCY MEDICINE
Payer: MEDICARE

## 2024-08-23 VITALS
HEIGHT: 67 IN | RESPIRATION RATE: 20 BRPM | WEIGHT: 155 LBS | HEART RATE: 61 BPM | BODY MASS INDEX: 24.33 KG/M2 | DIASTOLIC BLOOD PRESSURE: 74 MMHG | OXYGEN SATURATION: 100 % | SYSTOLIC BLOOD PRESSURE: 111 MMHG | TEMPERATURE: 98 F

## 2024-08-23 DIAGNOSIS — R07.9 CHEST PAIN: ICD-10-CM

## 2024-08-23 DIAGNOSIS — K04.7 DENTAL ABSCESS: ICD-10-CM

## 2024-08-23 DIAGNOSIS — R11.0 NAUSEA: ICD-10-CM

## 2024-08-23 DIAGNOSIS — F41.9 ANXIETY: Primary | ICD-10-CM

## 2024-08-23 LAB
ALBUMIN SERPL-MCNC: 4.1 G/DL (ref 3.5–5)
ALBUMIN/GLOB SERPL: 1 RATIO (ref 1.1–2)
ALP SERPL-CCNC: 87 UNIT/L (ref 40–150)
ALT SERPL-CCNC: 11 UNIT/L (ref 0–55)
ANION GAP SERPL CALC-SCNC: 15 MEQ/L
AST SERPL-CCNC: 14 UNIT/L (ref 5–34)
BASOPHILS # BLD AUTO: 0.03 X10(3)/MCL
BASOPHILS NFR BLD AUTO: 0.4 %
BILIRUB SERPL-MCNC: 0.3 MG/DL
BUN SERPL-MCNC: 16.9 MG/DL (ref 8.9–20.6)
CALCIUM SERPL-MCNC: 10.3 MG/DL (ref 8.4–10.2)
CHLORIDE SERPL-SCNC: 104 MMOL/L (ref 98–107)
CK SERPL-CCNC: 137 U/L (ref 30–200)
CO2 SERPL-SCNC: 21 MMOL/L (ref 22–29)
CREAT SERPL-MCNC: 1.27 MG/DL (ref 0.73–1.18)
CREAT/UREA NIT SERPL: 13
EOSINOPHIL # BLD AUTO: 0.08 X10(3)/MCL (ref 0–0.9)
EOSINOPHIL NFR BLD AUTO: 1.1 %
ERYTHROCYTE [DISTWIDTH] IN BLOOD BY AUTOMATED COUNT: 12.8 % (ref 11.5–17)
GFR SERPLBLD CREATININE-BSD FMLA CKD-EPI: >60 ML/MIN/1.73/M2
GLOBULIN SER-MCNC: 4.2 GM/DL (ref 2.4–3.5)
GLUCOSE SERPL-MCNC: 100 MG/DL (ref 74–100)
HCT VFR BLD AUTO: 41.1 % (ref 42–52)
HGB BLD-MCNC: 13.8 G/DL (ref 14–18)
IMM GRANULOCYTES # BLD AUTO: 0.03 X10(3)/MCL (ref 0–0.04)
IMM GRANULOCYTES NFR BLD AUTO: 0.4 %
LYMPHOCYTES # BLD AUTO: 1.52 X10(3)/MCL (ref 0.6–4.6)
LYMPHOCYTES NFR BLD AUTO: 20.1 %
MCH RBC QN AUTO: 25.5 PG (ref 27–31)
MCHC RBC AUTO-ENTMCNC: 33.6 G/DL (ref 33–36)
MCV RBC AUTO: 75.8 FL (ref 80–94)
MONOCYTES # BLD AUTO: 0.59 X10(3)/MCL (ref 0.1–1.3)
MONOCYTES NFR BLD AUTO: 7.8 %
NEUTROPHILS # BLD AUTO: 5.31 X10(3)/MCL (ref 2.1–9.2)
NEUTROPHILS NFR BLD AUTO: 70.2 %
NRBC BLD AUTO-RTO: 0 %
PLATELET # BLD AUTO: 223 X10(3)/MCL (ref 130–400)
PMV BLD AUTO: 10.6 FL (ref 7.4–10.4)
POTASSIUM SERPL-SCNC: 4.3 MMOL/L (ref 3.5–5.1)
PROT SERPL-MCNC: 8.3 GM/DL (ref 6.4–8.3)
RBC # BLD AUTO: 5.42 X10(6)/MCL (ref 4.7–6.1)
SODIUM SERPL-SCNC: 140 MMOL/L (ref 136–145)
TROPONIN I SERPL-MCNC: <0.01 NG/ML (ref 0–0.04)
WBC # BLD AUTO: 7.56 X10(3)/MCL (ref 4.5–11.5)

## 2024-08-23 PROCEDURE — 80053 COMPREHEN METABOLIC PANEL: CPT | Performed by: EMERGENCY MEDICINE

## 2024-08-23 PROCEDURE — 25000003 PHARM REV CODE 250: Performed by: EMERGENCY MEDICINE

## 2024-08-23 PROCEDURE — 96361 HYDRATE IV INFUSION ADD-ON: CPT

## 2024-08-23 PROCEDURE — 63600175 PHARM REV CODE 636 W HCPCS: Performed by: EMERGENCY MEDICINE

## 2024-08-23 PROCEDURE — 93005 ELECTROCARDIOGRAM TRACING: CPT

## 2024-08-23 PROCEDURE — 82550 ASSAY OF CK (CPK): CPT | Performed by: EMERGENCY MEDICINE

## 2024-08-23 PROCEDURE — 96374 THER/PROPH/DIAG INJ IV PUSH: CPT

## 2024-08-23 PROCEDURE — 84484 ASSAY OF TROPONIN QUANT: CPT | Performed by: EMERGENCY MEDICINE

## 2024-08-23 PROCEDURE — 93010 ELECTROCARDIOGRAM REPORT: CPT | Mod: ,,, | Performed by: INTERNAL MEDICINE

## 2024-08-23 PROCEDURE — 99284 EMERGENCY DEPT VISIT MOD MDM: CPT | Mod: 25

## 2024-08-23 PROCEDURE — 85025 COMPLETE CBC W/AUTO DIFF WBC: CPT | Performed by: EMERGENCY MEDICINE

## 2024-08-23 RX ORDER — ONDANSETRON HYDROCHLORIDE 2 MG/ML
4 INJECTION, SOLUTION INTRAVENOUS
Status: COMPLETED | OUTPATIENT
Start: 2024-08-23 | End: 2024-08-23

## 2024-08-23 RX ORDER — ONDANSETRON 4 MG/1
4 TABLET, ORALLY DISINTEGRATING ORAL EVERY 6 HOURS PRN
Qty: 12 TABLET | Refills: 0 | Status: SHIPPED | OUTPATIENT
Start: 2024-08-23

## 2024-08-23 RX ORDER — SODIUM CHLORIDE 9 MG/ML
1000 INJECTION, SOLUTION INTRAVENOUS
Status: COMPLETED | OUTPATIENT
Start: 2024-08-23 | End: 2024-08-23

## 2024-08-23 RX ADMIN — ONDANSETRON 4 MG: 2 INJECTION INTRAMUSCULAR; INTRAVENOUS at 12:08

## 2024-08-23 RX ADMIN — SODIUM CHLORIDE 1000 ML: 9 INJECTION, SOLUTION INTRAVENOUS at 10:08

## 2024-08-23 NOTE — ED PROVIDER NOTES
"Encounter Date: 8/23/2024       History     Chief Complaint   Patient presents with    Panic Attack     c/o chest pain and SOB with a dental abscess. Hx anxiety.     38-year-old male complains of intermittent chest pain and shortness of breath since yesterday.  States he thinks his anxiety is flaring up because of his dental infection.  He has been on antibiotics for a few days and states the dental pain has resolved in the swelling has improved.  However, the antibiotics are giving him a little bit of diarrhea and he says he feels dehydrated because he is working out in the heat.  Last episode of chest pain was just prior to arrival and he has no chest pain right now.  He is asking for IV fluids.        Review of patient's allergies indicates:   Allergen Reactions    Aspirin Shortness Of Breath    Ibuprofen Other (See Comments)     Per patient triggers " my asthma".     Penicillins      Past Medical History:   Diagnosis Date    Anxiety     Anxiety disorder, unspecified     GERD (gastroesophageal reflux disease)     Gout, unspecified     Sciatic nerve disease      History reviewed. No pertinent surgical history.  No family history on file.  Social History     Tobacco Use    Smoking status: Every Day     Current packs/day: 0.50     Types: Cigarettes    Smokeless tobacco: Never   Substance Use Topics    Alcohol use: Yes    Drug use: Yes     Types: Marijuana     Review of Systems   HENT:  Positive for dental problem.    Cardiovascular:  Positive for chest pain.   Gastrointestinal:  Positive for nausea.   Psychiatric/Behavioral:  The patient is nervous/anxious.    All other systems reviewed and are negative.      Physical Exam     Initial Vitals   BP Pulse Resp Temp SpO2   08/23/24 1026 08/23/24 1026 08/23/24 1026 08/23/24 1038 08/23/24 1026   117/83 (!) 55 20 98.1 °F (36.7 °C) 99 %      MAP       --                Physical Exam    Nursing note and vitals reviewed.  Constitutional: Vital signs are normal. He appears " well-developed and well-nourished.   HENT:   Head: Normocephalic and atraumatic.   Mouth/Throat: Oropharynx is clear and moist.   Extensive dental caries with multiple teeth broken to the gums.  Swelling noted to the mandible region no heat, tenderness, induration.   Eyes: Pupils are equal, round, and reactive to light.   Neck: Neck supple. No JVD present.   Cardiovascular:  Normal rate, regular rhythm and normal heart sounds.           Pulmonary/Chest: Breath sounds normal. No respiratory distress.   Abdominal: Abdomen is soft. There is no abdominal tenderness.   Musculoskeletal:         General: No edema.      Cervical back: Neck supple. No edema or erythema.     Lymphadenopathy:     He has no cervical adenopathy.   Neurological: He is alert and oriented to person, place, and time. No cranial nerve deficit. GCS score is 15. GCS eye subscore is 4. GCS verbal subscore is 5. GCS motor subscore is 6.   Skin: Skin is warm and dry. Capillary refill takes less than 2 seconds.         ED Course   Procedures  Labs Reviewed   CBC WITH DIFFERENTIAL - Abnormal       Result Value    WBC 7.56      RBC 5.42      Hgb 13.8 (*)     Hct 41.1 (*)     MCV 75.8 (*)     MCH 25.5 (*)     MCHC 33.6      RDW 12.8      Platelet 223      MPV 10.6 (*)     Neut % 70.2      Lymph % 20.1      Mono % 7.8      Eos % 1.1      Basophil % 0.4      Lymph # 1.52      Neut # 5.31      Mono # 0.59      Eos # 0.08      Baso # 0.03      IG# 0.03      IG% 0.4      NRBC% 0.0     COMPREHENSIVE METABOLIC PANEL - Abnormal    Sodium 140      Potassium 4.3      Chloride 104      CO2 21 (*)     Glucose 100      Blood Urea Nitrogen 16.9      Creatinine 1.27 (*)     Calcium 10.3 (*)     Protein Total 8.3      Albumin 4.1      Globulin 4.2 (*)     Albumin/Globulin Ratio 1.0 (*)     Bilirubin Total 0.3      ALP 87      ALT 11      AST 14      eGFR >60      Anion Gap 15.0      BUN/Creatinine Ratio 13     CK - Normal    Creatine Kinase 137     TROPONIN I - Normal     Troponin-I <0.010       EKG Readings: (Independently Interpreted)   Initial Reading: No STEMI. Rhythm: Normal Sinus Rhythm. Heart Rate: 78. ST Segments: Normal ST Segments. T Waves: Normal. Clinical Impression: Normal Sinus Rhythm       Imaging Results    None          Medications   0.9%  NaCl infusion (1,000 mLs Intravenous New Bag 8/23/24 1057)   ondansetron injection 4 mg (4 mg Intravenous Given 8/23/24 1215)     Medical Decision Making  See HPI for narrative    Differential diagnosis includes but is not limited to dental infection, dehydration, anxiety, ACS    Problems Addressed:  Anxiety:     Details: Patient has a history of anxiety and states that he has been working a lot in the heat and feels like the majority of his problem is due to anxiety.  Chest pain:     Details: No sign of acute coronary syndrome when EKGs and troponin.  Chest discomfort has been intermittent, last episode was this morning.  He can follow up with his PCP regarding intermittent chest discomfort.  Dental abscess:     Details: Patient states he has no dental pain this time that he feels his dental infection is clearing and that should be able to get all of his teeth removed next month.  He has the chlorhexidine mouthwash so there is no further management for me to do regarding his dental infection.  Nausea:     Details: Patient was given Zofran for nausea    Amount and/or Complexity of Data Reviewed  Labs: ordered. Decision-making details documented in ED Course.    Risk  Prescription drug management.               ED Course as of 08/23/24 1220   Fri Aug 23, 2024   1144 BUN: 16.9 [SH]   1145 Creatinine(!): 1.27 [SH]   1159 Hemoglobin(!): 13.8 [SH]   1159 Hematocrit(!): 41.1 [SH]   1201 Troponin I: <0.010 [SH]   1201 Sodium: 140 [SH]   1201 Potassium: 4.3 [SH]   1201 Chloride: 104 [SH]   1201 CO2(!): 21 [SH]   1201 Glucose: 100 [SH]   1201 BUN: 16.9 [SH]   1201 Creatinine(!): 1.27 [SH]   1201 WBC: 7.56 [SH]   1201 Hemoglobin(!): 13.8  []   1201 Hematocrit(!): 41.1 [SH]   1201 Platelet Count: 223  Patient states he feels much better in general but he still has some nausea so will give a dose of Zofran.  EKG and troponin fine so do not think his chest discomfort is [SH]      ED Course User Index  [] Eunice Robbins MD                           Clinical Impression:  Final diagnoses:  [R07.9] Chest pain  [F41.9] Anxiety (Primary)  [R11.0] Nausea  [K04.7] Dental abscess          ED Disposition Condition    Discharge Stable          ED Prescriptions       Medication Sig Dispense Start Date End Date Auth. Provider    ondansetron (ZOFRAN-ODT) 4 MG TbDL Take 1 tablet (4 mg total) by mouth every 6 (six) hours as needed (nausea). 12 tablet 8/23/2024 -- Eunice Robbins MD          Follow-up Information       Follow up With Specialties Details Why Contact Info    Juno Mendoza, DO Family Medicine Schedule an appointment as soon as possible for a visit  Keep your appointment with your dentist [address] [facility] [address]  [phone]               Eunice Robbins MD  08/23/24 1226

## 2024-08-25 LAB
OHS QRS DURATION: 90 MS
OHS QTC CALCULATION: 433 MS

## 2024-09-14 ENCOUNTER — HOSPITAL ENCOUNTER (EMERGENCY)
Facility: HOSPITAL | Age: 39
Discharge: HOME | End: 2024-09-14
Attending: EMERGENCY MEDICINE
Payer: MEDICARE

## 2024-09-14 VITALS
WEIGHT: 160 LBS | HEIGHT: 67 IN | OXYGEN SATURATION: 98 % | DIASTOLIC BLOOD PRESSURE: 79 MMHG | RESPIRATION RATE: 18 BRPM | HEART RATE: 82 BPM | SYSTOLIC BLOOD PRESSURE: 112 MMHG | BODY MASS INDEX: 25.11 KG/M2 | TEMPERATURE: 98 F

## 2024-09-14 DIAGNOSIS — S39.012A LUMBAR STRAIN, INITIAL ENCOUNTER: Primary | ICD-10-CM

## 2024-09-14 PROCEDURE — 25000003 PHARM REV CODE 250: Performed by: EMERGENCY MEDICINE

## 2024-09-14 PROCEDURE — 99284 EMERGENCY DEPT VISIT MOD MDM: CPT | Mod: 25

## 2024-09-14 RX ORDER — HYDROCODONE BITARTRATE AND ACETAMINOPHEN 7.5; 325 MG/1; MG/1
1 TABLET ORAL EVERY 6 HOURS PRN
Qty: 20 TABLET | Refills: 0 | Status: SHIPPED | OUTPATIENT
Start: 2024-09-14 | End: 2024-09-19

## 2024-09-14 RX ORDER — CYCLOBENZAPRINE HCL 10 MG
10 TABLET ORAL 3 TIMES DAILY PRN
Qty: 15 TABLET | Refills: 0 | Status: SHIPPED | OUTPATIENT
Start: 2024-09-14 | End: 2024-09-19

## 2024-09-14 RX ORDER — HYDROCODONE BITARTRATE AND ACETAMINOPHEN 10; 325 MG/1; MG/1
1 TABLET ORAL
Status: COMPLETED | OUTPATIENT
Start: 2024-09-14 | End: 2024-09-14

## 2024-09-14 RX ORDER — CYCLOBENZAPRINE HCL 10 MG
10 TABLET ORAL
Status: COMPLETED | OUTPATIENT
Start: 2024-09-14 | End: 2024-09-14

## 2024-09-14 RX ADMIN — CYCLOBENZAPRINE 10 MG: 10 TABLET, FILM COATED ORAL at 11:09

## 2024-09-14 RX ADMIN — HYDROCODONE BITARTRATE AND ACETAMINOPHEN 1 TABLET: 10; 325 TABLET ORAL at 11:09

## 2024-09-14 NOTE — ED PROVIDER NOTES
"Encounter Date: 9/14/2024       History     Chief Complaint   Patient presents with    Back Pain     right sided sciatica pain after attempting to load a BBQ into his truck, he felt a pulling pain in his right lower back hx of sciatica nerve pain     The history is provided by the patient.   Back Pain   This is a recurrent problem. The current episode started today. The problem has been unchanged. The pain is associated with lifting heavy objects. The pain is present in the lumbar spine. The quality of the pain is described as stabbing. The pain does not radiate. The pain is The same all the time. Pertinent negatives include no chest pain, no fever, no bowel incontinence, no perianal numbness, no bladder incontinence, no dysuria, no pelvic pain, no leg pain, no paresthesias, no paresis, no tingling and no weakness. He has tried nothing for the symptoms.   Has known lumbar disc disease.  Pain flared up after lifting BBQ pit into pickup truck.    Review of patient's allergies indicates:   Allergen Reactions    Aspirin Shortness Of Breath    Ibuprofen Other (See Comments)     Per patient triggers " my asthma".     Penicillins      Past Medical History:   Diagnosis Date    Anxiety     Anxiety disorder, unspecified     GERD (gastroesophageal reflux disease)     Gout, unspecified     Sciatic nerve disease      History reviewed. No pertinent surgical history.  No family history on file.  Social History     Tobacco Use    Smoking status: Every Day     Current packs/day: 0.50     Types: Cigarettes    Smokeless tobacco: Never   Substance Use Topics    Alcohol use: Yes    Drug use: Yes     Types: Marijuana     Review of Systems   Constitutional:  Negative for fever.   HENT:  Negative for sore throat.    Respiratory:  Negative for shortness of breath.    Cardiovascular:  Negative for chest pain.   Gastrointestinal:  Negative for bowel incontinence and nausea.   Genitourinary:  Negative for bladder incontinence, dysuria and " pelvic pain.   Musculoskeletal:  Positive for back pain.   Skin:  Negative for rash.   Neurological:  Negative for tingling, weakness and paresthesias.   Hematological:  Does not bruise/bleed easily.       Physical Exam     Initial Vitals [09/14/24 1110]   BP Pulse Resp Temp SpO2   112/79 82 18 97.9 °F (36.6 °C) 98 %      MAP       --         Physical Exam    Nursing note and vitals reviewed.  Constitutional: He appears well-developed and well-nourished.   HENT:   Head: Normocephalic and atraumatic.   Right Ear: External ear normal.   Left Ear: External ear normal.   Nose: Nose normal.   Eyes: Conjunctivae and EOM are normal. Pupils are equal, round, and reactive to light.   Neck: Neck supple.   Normal range of motion.  Cardiovascular:  Normal rate, regular rhythm, normal heart sounds and intact distal pulses.           Pulmonary/Chest: Breath sounds normal.   Abdominal: Abdomen is soft. Bowel sounds are normal.   Musculoskeletal:         General: Normal range of motion.      Cervical back: Normal range of motion and neck supple.        Back:      Neurological: He is alert and oriented to person, place, and time. He has normal strength. GCS score is 15. GCS eye subscore is 4. GCS verbal subscore is 5. GCS motor subscore is 6.   Skin: Skin is warm and dry. Capillary refill takes less than 2 seconds.   Psychiatric: He has a normal mood and affect. His behavior is normal. Judgment and thought content normal.         ED Course   Procedures  Labs Reviewed - No data to display       Imaging Results              X-Ray Lumbar Spine Ap And Lateral (Preliminary result)  Result time 09/14/24 11:53:26      Wet Read by Ludin Salazar MD (09/14/24 11:53:26, [facility] - Emergency Dept, Emergency Medicine)    No fracture or subluxation                                     Medications   HYDROcodone-acetaminophen  mg per tablet 1 tablet (1 tablet Oral Given 9/14/24 1129)   cyclobenzaprine tablet 10  mg (10 mg Oral Given 9/14/24 1126)     Medical Decision Making  Amount and/or Complexity of Data Reviewed  Radiology: ordered and independent interpretation performed. Decision-making details documented in ED Course.    Risk  Prescription drug management.    Differential includes:  strain, compression Fx, herniated disc.  Will give analgesic, muscle relaxant and obtain L-spine x-rays.                                  Clinical Impression:  Final diagnoses:  [S39.012A] Lumbar strain, initial encounter (Primary)          ED Disposition Condition    Discharge Stable          ED Prescriptions       Medication Sig Dispense Start Date End Date Auth. Provider    HYDROcodone-acetaminophen (NORCO) 7.5-325 mg per tablet Take 1 tablet by mouth every 6 (six) hours as needed for Pain. 20 tablet 9/14/2024 9/19/2024 Ludin Salazar MD    cyclobenzaprine (FLEXERIL) 10 MG tablet Take 1 tablet (10 mg total) by mouth 3 (three) times daily as needed for Muscle spasms. 15 tablet 9/14/2024 9/19/2024 Ludin Salazar MD          Follow-up Information       Follow up With Specialties Details Why Contact Info    Juno Mendoza,  Family Medicine Schedule an appointment as soon as possible for a visit   [address] [facility] [id number]  [phone]               Ludin Salazar MD  09/14/24 1664

## 2024-10-07 ENCOUNTER — HOSPITAL ENCOUNTER (EMERGENCY)
Facility: HOSPITAL | Age: 39
Discharge: HOME | End: 2024-10-07
Attending: EMERGENCY MEDICINE
Payer: MEDICARE

## 2024-10-07 VITALS
WEIGHT: 166 LBS | RESPIRATION RATE: 20 BRPM | TEMPERATURE: 98 F | SYSTOLIC BLOOD PRESSURE: 108 MMHG | BODY MASS INDEX: 26.06 KG/M2 | DIASTOLIC BLOOD PRESSURE: 72 MMHG | OXYGEN SATURATION: 99 % | HEIGHT: 67 IN | HEART RATE: 93 BPM

## 2024-10-07 DIAGNOSIS — R52 PAIN: ICD-10-CM

## 2024-10-07 DIAGNOSIS — S52.614A CLOSED NONDISPLACED FRACTURE OF STYLOID PROCESS OF RIGHT ULNA, INITIAL ENCOUNTER: Primary | ICD-10-CM

## 2024-10-07 DIAGNOSIS — S52.501A CLOSED FRACTURE OF DISTAL END OF RIGHT RADIUS, UNSPECIFIED FRACTURE MORPHOLOGY, INITIAL ENCOUNTER: ICD-10-CM

## 2024-10-07 PROCEDURE — 99283 EMERGENCY DEPT VISIT LOW MDM: CPT | Mod: 25

## 2024-10-07 PROCEDURE — 29125 APPL SHORT ARM SPLINT STATIC: CPT | Mod: RT

## 2024-10-07 RX ORDER — HYDROCODONE BITARTRATE AND ACETAMINOPHEN 7.5; 325 MG/1; MG/1
1 TABLET ORAL EVERY 6 HOURS PRN
Qty: 12 TABLET | Refills: 0 | Status: SHIPPED | OUTPATIENT
Start: 2024-10-07 | End: 2024-10-10

## 2024-10-07 NOTE — ED PROVIDER NOTES
"Encounter Date: 10/7/2024       History     Chief Complaint   Patient presents with    Wrist Injury     Pt c/o pain to right wrist after hitting a door yesterday.     The patient is a 38 y.o. male with a history of anxiety who presents to the Emergency Department with a chief complaint of right wrist pain. Patient states he punched a door yesterday. He is now having pain in his right hand, right wrist, and right elbow. Symptoms began yesterday and have been constant since onset. His pain is currently rated as a 8/10 in severity and described as aching with no radiation. Associated symptoms include swelling. Symptoms are aggravated with movement and there are no alleviating factors. The patient denies numbness or tingling to extremity. He reports taking nothing prior to arrival with no relief of symptoms. No other reported symptoms at this time     The history is provided by the patient. No  was used.   Wrist Injury   The incident occurred yesterday. The incident occurred at home. The injury mechanism was a direct blow. The pain is present in the right elbow, right wrist and right hand. The quality of the pain is described as aching. The pain is at a severity of 8/10. The pain has been Constant since the incident. Pertinent negatives include no fever and no malaise/fatigue. The symptoms are aggravated by movement, use and palpation. He has tried nothing for the symptoms. The treatment provided no relief.     Review of patient's allergies indicates:   Allergen Reactions    Aspirin Shortness Of Breath    Ibuprofen Other (See Comments)     Per patient triggers " my asthma".     Penicillins      Past Medical History:   Diagnosis Date    Anxiety     Anxiety disorder, unspecified     GERD (gastroesophageal reflux disease)     Gout, unspecified     Sciatic nerve disease      History reviewed. No pertinent surgical history.  No family history on file.  Social History     Tobacco Use    Smoking status: " Every Day     Current packs/day: 0.50     Types: Cigarettes    Smokeless tobacco: Never   Substance Use Topics    Alcohol use: Yes    Drug use: Yes     Types: Marijuana     Review of Systems   Constitutional:  Negative for fever and malaise/fatigue.   HENT:  Negative for sore throat.    Respiratory:  Negative for shortness of breath.    Cardiovascular:  Negative for chest pain.   Gastrointestinal:  Negative for nausea.   Genitourinary:  Negative for dysuria.   Musculoskeletal:  Positive for arthralgias and joint swelling. Negative for back pain.   Skin:  Negative for rash.   Neurological:  Negative for weakness.   Hematological:  Does not bruise/bleed easily.   All other systems reviewed and are negative.      Physical Exam     Initial Vitals [10/07/24 1607]   BP Pulse Resp Temp SpO2   108/72 93 20 97.7 °F (36.5 °C) 99 %      MAP       --         Physical Exam    Nursing note and vitals reviewed.  Constitutional: Vital signs are normal. He appears well-developed and well-nourished.   HENT:   Head: Normocephalic.   Right Ear: Hearing and tympanic membrane normal.   Left Ear: Hearing and tympanic membrane normal. Mouth/Throat: Uvula is midline, oropharynx is clear and moist and mucous membranes are normal.   Eyes: Conjunctivae and EOM are normal. Pupils are equal, round, and reactive to light.   Cardiovascular:  Normal rate, regular rhythm, normal heart sounds and normal pulses.           Pulmonary/Chest: Effort normal and breath sounds normal.   Abdominal: Abdomen is soft. Bowel sounds are normal. There is no abdominal tenderness.   Musculoskeletal:      Right elbow: No swelling. Tenderness present.      Left elbow: Normal.      Right wrist: Swelling and bony tenderness present. Normal pulse.      Left wrist: Normal.      Right hand: Swelling and bony tenderness present. Normal capillary refill. Normal pulse.      Left hand: Normal.      Cervical back: No spinous process tenderness or muscular tenderness.       Comments: Neurovascularly intact.   All other adjacent joints normal      Lymphadenopathy:     He has no cervical adenopathy.   Neurological: He is alert. GCS eye subscore is 4. GCS verbal subscore is 5. GCS motor subscore is 6.   Skin: Skin is warm, dry and intact. Capillary refill takes less than 2 seconds.         ED Course   Procedures  Labs Reviewed - No data to display       Imaging Results              X-Ray Hand 3 view Right (Final result)  Result time 10/07/24 17:05:39      Final result by Chris Bishop MD (10/07/24 17:05:39)                   Impression:      Fracture distal radius and ulna      Electronically signed by: Pascual Bishop  Date:    10/07/2024  Time:    17:05               Narrative:    EXAMINATION:  XR HAND COMPLETE 3 VIEW RIGHT    CLINICAL HISTORY:  right hand pain;    TECHNIQUE:  PA, lateral, and oblique views of the right hand were performed.    COMPARISON:  06/05/2024    FINDINGS:  There is a fracture of the distal radial metaphysis.  There is minimal dorsal angulation.  There is also fracture of the ulnar styloid.  Diffuse soft tissue swelling seen in the wrist.  No hand fracture is seen.                                       X-Ray Wrist Complete Right (Final result)  Result time 10/07/24 17:05:00      Final result by Chris Bishop MD (10/07/24 17:05:00)                   Impression:      Fracture of the distal radius and ulna      Electronically signed by: Pascual Bishop  Date:    10/07/2024  Time:    17:05               Narrative:    EXAMINATION:  XR WRIST COMPLETE 3 VIEWS RIGHT    CLINICAL HISTORY:  Pain, unspecified    TECHNIQUE:  PA, lateral, and oblique views of the right wrist were performed.    COMPARISON:  None    FINDINGS:  There is a fracture distal radial metaphysis with slight dorsal angulation.  There is also a fracture of the ulnar styloid.  There is diffuse soft tissue swelling in the wrist.                                       X-Ray Elbow Complete  Right (Final result)  Result time 10/07/24 16:59:31      Final result by Chris Bishop MD (10/07/24 16:59:31)                   Impression:      There is no abnormality seen      Electronically signed by: Pascual Bishop  Date:    10/07/2024  Time:    16:59               Narrative:    EXAMINATION:  XR ELBOW COMPLETE 3 VIEW RIGHT    CLINICAL HISTORY:  . Pain, unspecified    TECHNIQUE:  AP, lateral, and oblique views of the right elbow were performed.    COMPARISON:  None    FINDINGS:  The bones and joints are in good anatomic alignment.  No fracture is seen.  No dislocation is seen.  No soft tissue abnormality is seen.                                       Medications - No data to display  Medical Decision Making  The patient is a 38 y.o. male with a history of anxiety who presents to the Emergency Department with a chief complaint of right wrist pain. Patient states he punched a door yesterday. He is now having pain in his right hand, right wrist, and right elbow. Symptoms began yesterday and have been constant since onset. His pain is currently rated as a 8/10 in severity and described as aching with no radiation. Associated symptoms include swelling. Symptoms are aggravated with movement and there are no alleviating factors. The patient denies numbness or tingling to extremity. He reports taking nothing prior to arrival with no relief of symptoms. No other reported symptoms at this time     Judging by the patient's chief complaint and pertinent history, the patient has the following possible differential diagnoses, including but not limited to the following.  Some of these are deemed to be lower likelihood and some more likely based on my physical exam and history combined with possible lab work and/or imaging studies.   Please see the pertinent studies, and refer to the HPI.  Some of these diagnoses will take further evaluation to fully rule out, perhaps as an outpatient and the patient was encouraged to  follow up when discharged for more comprehensive evaluation.      Wrist fracture, wrist sprain, hand fracture, hand sprain, elbow sprain     Problems Addressed:  Closed fracture of distal end of right radius, unspecified fracture morphology, initial encounter: acute illness or injury  Closed nondisplaced fracture of styloid process of right ulna, initial encounter: acute illness or injury    Amount and/or Complexity of Data Reviewed  Radiology: ordered. Decision-making details documented in ED Course.    Risk  Prescription drug management.               ED Course as of 10/07/24 1734   Mon Oct 07, 2024   1705 X-Ray Elbow Complete Right  Impression:     There is no abnormality seen   [LM]   1713 X-Ray Wrist Complete Right  FINDINGS:  There is a fracture distal radial metaphysis with slight dorsal angulation.  There is also a fracture of the ulnar styloid.  There is diffuse soft tissue swelling in the wrist.     Impression:     Fracture of the distal radius and ulna   [LM]   1726 Will place patient in splint and sling [LM]   1732 I reassessed neurovascular status after application of splint which remains intact.  [LM]   1733 I discussed results in detail with patient including follow up. He is amendable to plan and ready for discharge home. He was given strict ER return precautions. Referral sent to orthopedic surgery.  [LM]      ED Course User Index  [LM] Selena Gonzales, NP                           Clinical Impression:  Final diagnoses:  [R52] Pain  [S52.614A] Closed nondisplaced fracture of styloid process of right ulna, initial encounter (Primary)  [S52.501A] Closed fracture of distal end of right radius, unspecified fracture morphology, initial encounter          ED Disposition Condition    Discharge Stable          ED Prescriptions       Medication Sig Dispense Start Date End Date Auth. Provider    HYDROcodone-acetaminophen (NORCO) 7.5-325 mg per tablet Take 1 tablet by mouth every 6 (six) hours as needed for  Pain. 12 tablet 10/7/2024 10/10/2024 Selena Gonzales NP          Follow-up Information       Follow up With Specialties Details Why Contact Info Additional Information    Codey Medina MD Orthopedic Surgery Schedule an appointment as soon as possible for a visit   [address] [place] [id number]  [phone]        [facility] Schedule an appointment as soon as possible for a visit   [address] [place] [address], [address] [place] [id number] [phone] [place] [address]             Selena Gonzales NP  10/07/24 1739

## 2024-10-10 ENCOUNTER — OFFICE VISIT (OUTPATIENT)
Dept: ORTHOPEDICS | Facility: CLINIC | Age: 39
End: 2024-10-10
Payer: MEDICARE

## 2024-10-10 VITALS
WEIGHT: 166 LBS | DIASTOLIC BLOOD PRESSURE: 72 MMHG | SYSTOLIC BLOOD PRESSURE: 105 MMHG | HEART RATE: 77 BPM | BODY MASS INDEX: 26.06 KG/M2 | HEIGHT: 67 IN

## 2024-10-10 DIAGNOSIS — S52.614A CLOSED NONDISPLACED FRACTURE OF STYLOID PROCESS OF RIGHT ULNA, INITIAL ENCOUNTER: ICD-10-CM

## 2024-10-10 DIAGNOSIS — S52.501A CLOSED FRACTURE OF DISTAL END OF RIGHT RADIUS, UNSPECIFIED FRACTURE MORPHOLOGY, INITIAL ENCOUNTER: Primary | ICD-10-CM

## 2024-10-10 PROCEDURE — 1160F RVW MEDS BY RX/DR IN RCRD: CPT | Mod: CPTII,,,

## 2024-10-10 PROCEDURE — 99214 OFFICE O/P EST MOD 30 MIN: CPT | Mod: ,,,

## 2024-10-10 PROCEDURE — 3074F SYST BP LT 130 MM HG: CPT | Mod: CPTII,,,

## 2024-10-10 PROCEDURE — 1159F MED LIST DOCD IN RCRD: CPT | Mod: CPTII,,,

## 2024-10-10 PROCEDURE — 3008F BODY MASS INDEX DOCD: CPT | Mod: CPTII,,,

## 2024-10-10 PROCEDURE — 3078F DIAST BP <80 MM HG: CPT | Mod: CPTII,,,

## 2024-10-10 RX ORDER — HYDROCODONE BITARTRATE AND ACETAMINOPHEN 5; 325 MG/1; MG/1
1 TABLET ORAL EVERY 8 HOURS PRN
Qty: 21 TABLET | Refills: 0 | Status: SHIPPED | OUTPATIENT
Start: 2024-10-10

## 2024-10-10 NOTE — PROGRESS NOTES
Subjective:    CC: Injury (Closed FX of distal end of rt radius- Reports re-injured  wrist on 10/7/24 by punching with palm instead of closed fist and pain is in wrist and elbow. Stated has swelling in wrist and has stiff fingers. Has numbness in fingers and wrist. Is taking norco which helped some but not longer taking due to being out. Would like to get back with [facility] PT. )       HPI:  Patient presents to clinic for evaluation of right wrist pain.  Patient states that on 10/06/2024 he punched a door with the open hand.  He presented to the ER on 10/07/2024 where he had x-rays revealing distal radius and ulna fracture.  He was placed in a sugar-tong splint.  Today he endorses pain, swelling.  Does have a history of previous boxer fracture managed conservatively by Dr. Sandy.  He has previously been taking Norco which was helping but has run out of this.  He is interested in returning to [facility] physical therapy once it is time.  No Previous hand or wrist surgeries.  He denies any numbness or tingling in the hand.  Patient can not take NSAIDs due to aspirin allergy.    ROS: Refer to HPI for pertinent ROS. All other 12 point systems negative.    Past Medical History:   Diagnosis Date    Anxiety     Anxiety disorder, unspecified     GERD (gastroesophageal reflux disease)     Gout, unspecified     Sciatic nerve disease         History reviewed. No pertinent surgical history.     Current Outpatient Medications on File Prior to Visit   Medication Sig Dispense Refill    ADVAIR -21 mcg/actuation HFAA inhaler Inhale 2 puffs into the lungs 2 (two) times daily.      albuterol (PROVENTIL) 2.5 mg /3 mL (0.083 %) nebulizer solution USE 1 VIAL VIA NEBULIZER 3 TIMES A DAY      buPROPion (WELLBUTRIN SR) 150 MG TBSR 12 hr tablet Take 150 mg by mouth.      clonazePAM (KLONOPIN) 1 MG disintegrating tablet Take 1 mg by mouth.      FLOVENT  mcg/actuation inhaler TAKE 1 PUFF BY MOUTH TWICE A DAY      pantoprazole  "(PROTONIX) 20 MG tablet       sertraline (ZOLOFT) 100 MG tablet Take 100 mg by mouth once daily.      VRAYLAR 1.5 mg Cap Take 1.5 mg by mouth once daily.      [DISCONTINUED] HYDROcodone-acetaminophen (NORCO) 7.5-325 mg per tablet Take 1 tablet by mouth every 6 (six) hours as needed for Pain. (Patient not taking: Reported on 10/10/2024) 12 tablet 0    [DISCONTINUED] ondansetron (ZOFRAN-ODT) 4 MG TbDL Take 1 tablet (4 mg total) by mouth every 6 (six) hours as needed (nausea). (Patient not taking: Reported on 10/10/2024) 12 tablet 0     No current facility-administered medications on file prior to visit.        Review of patient's allergies indicates:   Allergen Reactions    Aspirin Shortness Of Breath    Ibuprofen Other (See Comments)     Per patient triggers " my asthma".     Penicillins        Objective:    Vitals:    10/10/24 0939   BP: 105/72   Pulse: 77        Physical Exam:  Right upper extremity compartments are soft and warm.  Skin is intact.  There are no signs or symptoms of a DVT or infection.  Does have diffuse swelling from the forearm into the hand.  He is tender to palpation about the radial and ulnar wrist globally.  Limited range of motion at the wrist.  No gross step-off, no dinner fork deformity.  Sensation intact to light touch.  Neurovascularly intact distally.    Images:  ER x-ray Images Reviewed and discussed with patient.    Assessment:  1. Closed nondisplaced fracture of styloid process of right ulna, initial encounter  - Ambulatory referral/consult to Orthopedics  - HYDROcodone-acetaminophen (NORCO) 5-325 mg per tablet; Take 1 tablet by mouth every 8 (eight) hours as needed for Pain.  Dispense: 21 tablet; Refill: 0    2. Closed fracture of distal end of right radius, unspecified fracture morphology, initial encounter  - Ambulatory referral/consult to Orthopedics  - HYDROcodone-acetaminophen (NORCO) 5-325 mg per tablet; Take 1 tablet by mouth every 8 (eight) hours as needed for Pain.  " Dispense: 21 tablet; Refill: 0       Plan:  Physical exam and ER report and imaging findings discussed with patient.  We have discussed ice, elevation, Tylenol to address swelling.  Patient was placed into an Exos splint today in clinic.  Instructed nonweightbearing, no heavy lifting, pushing or pulling.  Prescription pain medicine sent in with appropriate precautions.  I would like to see the patient back in 1 week with repeat x-rays to assess his progress.      Follow up: Follow up in about 1 week (around 10/17/2024).

## 2024-10-17 ENCOUNTER — HOSPITAL ENCOUNTER (OUTPATIENT)
Dept: RADIOLOGY | Facility: CLINIC | Age: 39
Discharge: HOME | End: 2024-10-17
Payer: MEDICARE

## 2024-10-17 ENCOUNTER — OFFICE VISIT (OUTPATIENT)
Dept: ORTHOPEDICS | Facility: CLINIC | Age: 39
End: 2024-10-17
Payer: MEDICARE

## 2024-10-17 VITALS — WEIGHT: 166 LBS | BODY MASS INDEX: 26.06 KG/M2 | HEIGHT: 67 IN

## 2024-10-17 DIAGNOSIS — S52.614A CLOSED NONDISPLACED FRACTURE OF STYLOID PROCESS OF RIGHT ULNA, INITIAL ENCOUNTER: ICD-10-CM

## 2024-10-17 DIAGNOSIS — S52.501A CLOSED FRACTURE OF DISTAL END OF RIGHT RADIUS, UNSPECIFIED FRACTURE MORPHOLOGY, INITIAL ENCOUNTER: Primary | ICD-10-CM

## 2024-10-17 DIAGNOSIS — S52.501A CLOSED FRACTURE OF DISTAL END OF RIGHT RADIUS, UNSPECIFIED FRACTURE MORPHOLOGY, INITIAL ENCOUNTER: ICD-10-CM

## 2024-10-17 PROCEDURE — 3008F BODY MASS INDEX DOCD: CPT | Mod: CPTII,,,

## 2024-10-17 PROCEDURE — 99214 OFFICE O/P EST MOD 30 MIN: CPT | Mod: ,,,

## 2024-10-17 PROCEDURE — 1160F RVW MEDS BY RX/DR IN RCRD: CPT | Mod: CPTII,,,

## 2024-10-17 PROCEDURE — 73110 X-RAY EXAM OF WRIST: CPT | Mod: RT,,,

## 2024-10-17 PROCEDURE — 1159F MED LIST DOCD IN RCRD: CPT | Mod: CPTII,,,

## 2024-10-17 NOTE — PROGRESS NOTES
Subjective:    CC: Follow-up of the Right Wrist (R wrist fx - pt states that he has some stiffness and soreness, but he is able to move his fingers more. He is in his exos brace. )       HPI:  Patient presents to clinic for repeat evaluation of right wrist.  He is almost 2 weeks out from his right distal radius fracture sustained on 10/06/2024.  He states he has been in his Exos brace.  Nonweightbearing no heavy lifting.  He has some slight stiffness and soreness but is otherwise doing well.  Able to move his fingers appropriately.  Denies any numbness, tingling.  No new complaints.     ROS: Refer to HPI for pertinent ROS. All other 12 point systems negative.    Past Medical History:   Diagnosis Date    Anxiety     Anxiety disorder, unspecified     GERD (gastroesophageal reflux disease)     Gout, unspecified     Sciatic nerve disease         History reviewed. No pertinent surgical history.     Current Outpatient Medications on File Prior to Visit   Medication Sig Dispense Refill    ADVAIR -21 mcg/actuation HFAA inhaler Inhale 2 puffs into the lungs 2 (two) times daily.      albuterol (PROVENTIL) 2.5 mg /3 mL (0.083 %) nebulizer solution USE 1 VIAL VIA NEBULIZER 3 TIMES A DAY      buPROPion (WELLBUTRIN SR) 150 MG TBSR 12 hr tablet Take 150 mg by mouth.      clonazePAM (KLONOPIN) 1 MG disintegrating tablet Take 1 mg by mouth.      FLOVENT  mcg/actuation inhaler TAKE 1 PUFF BY MOUTH TWICE A DAY      HYDROcodone-acetaminophen (NORCO) 5-325 mg per tablet Take 1 tablet by mouth every 8 (eight) hours as needed for Pain. 21 tablet 0    pantoprazole (PROTONIX) 20 MG tablet       sertraline (ZOLOFT) 100 MG tablet Take 100 mg by mouth once daily.      VRAYLAR 1.5 mg Cap Take 1.5 mg by mouth once daily.       No current facility-administered medications on file prior to visit.        Review of patient's allergies indicates:   Allergen Reactions    Aspirin Shortness Of Breath    Ibuprofen Other (See Comments)      "Per patient triggers " my asthma".     Penicillins        Objective:    There were no vitals filed for this visit.     Physical Exam:  Right upper extremity compartments are soft and warm.  Skin is intact.  There are no signs or symptoms of a DVT or infection.  He is point tender about the dorsal wrist most symptomatically about the ulnar styloid.  He has difficulty with pronation and supination.  Tolerates gentle flexion and extension of the wrist.  Able to flex and extend all fingers appropriately.  No gross deformity.  Neurovascularly intact distally.    Images:  X-rays:  Three views of the right wrist demonstrate distal radius and ulnar styloid fracture with interval healing.  No further propagation or displacement.  Images Reviewed and discussed with patient.    Assessment:  1. Closed fracture of distal end of right radius, unspecified fracture morphology, initial encounter  - X-Ray Wrist Complete Right; Future    2. Closed nondisplaced fracture of styloid process of right ulna, initial encounter       Plan:  Physical exam and imaging findings discussed with patient.  He was placed back into his Exos brace.  Instructed to remain nonweightbearing, no heavy lifting, pushing or pulling.  Okay to come out of brace for hygiene otherwise remain in.  Okay for gentle finger range of motion.  Okay for motion at the elbow.  We have discussed OTC anti-inflammatories when needed with appropriate precautions along with ice and elevation to address swelling when needed.  I would otherwise like to see the patient back in 4 weeks with repeat x-rays to assess his progress.    Follow up: Follow up in about 4 weeks (around 11/14/2024).            "

## 2024-11-14 ENCOUNTER — HOSPITAL ENCOUNTER (OUTPATIENT)
Dept: RADIOLOGY | Facility: CLINIC | Age: 39
Discharge: HOME | End: 2024-11-14
Attending: ORTHOPAEDIC SURGERY
Payer: MEDICARE

## 2024-11-14 ENCOUNTER — OFFICE VISIT (OUTPATIENT)
Dept: ORTHOPEDICS | Facility: CLINIC | Age: 39
End: 2024-11-14
Payer: MEDICARE

## 2024-11-14 VITALS — WEIGHT: 154 LBS | BODY MASS INDEX: 24.17 KG/M2 | HEIGHT: 67 IN

## 2024-11-14 DIAGNOSIS — S52.614A CLOSED NONDISPLACED FRACTURE OF STYLOID PROCESS OF RIGHT ULNA, INITIAL ENCOUNTER: ICD-10-CM

## 2024-11-14 DIAGNOSIS — S52.501A CLOSED FRACTURE OF DISTAL END OF RIGHT RADIUS, UNSPECIFIED FRACTURE MORPHOLOGY, INITIAL ENCOUNTER: ICD-10-CM

## 2024-11-14 DIAGNOSIS — S52.614A CLOSED NONDISPLACED FRACTURE OF STYLOID PROCESS OF RIGHT ULNA, INITIAL ENCOUNTER: Primary | ICD-10-CM

## 2024-11-14 PROCEDURE — 99214 OFFICE O/P EST MOD 30 MIN: CPT | Mod: ,,, | Performed by: ORTHOPAEDIC SURGERY

## 2024-11-14 PROCEDURE — 3008F BODY MASS INDEX DOCD: CPT | Mod: CPTII,,, | Performed by: ORTHOPAEDIC SURGERY

## 2024-11-14 PROCEDURE — 1160F RVW MEDS BY RX/DR IN RCRD: CPT | Mod: CPTII,,, | Performed by: ORTHOPAEDIC SURGERY

## 2024-11-14 PROCEDURE — 1159F MED LIST DOCD IN RCRD: CPT | Mod: CPTII,,, | Performed by: ORTHOPAEDIC SURGERY

## 2024-11-14 PROCEDURE — 73110 X-RAY EXAM OF WRIST: CPT | Mod: RT,,, | Performed by: ORTHOPAEDIC SURGERY

## 2024-11-18 NOTE — PROGRESS NOTES
"Subjective:    CC: Follow-up (4wk f/u left wrist fx. Xr today.DOI 10/6/24. Doing well overall. States some pain here and there mostly at night and sometimes nerve/muscle pulling feeling as well as spasms. Pain well controlled.)       HPI:  Patient returns today for repeat exam.  Patient is a proximally 5 weeks from his left distal radius fracture.  Patient states he does have some pain at times.  Otherwise his improving.    ROS: Refer to HPI for pertinent ROS. All other 12 point systems negative.    Objective:  Vitals:    11/14/24 1324   Weight: 69.9 kg (154 lb)   Height: 5' 7" (1.702 m)        Physical Exam:  Right upper extremity compartment soft and warm.  Skin is intact.  There is no signs symptoms of DVT or infection.  He has minimal tenderness along the distal radius.  The bruising swelling has resolved.  Minimally tender along the ulnar aspect.  Does have some stiffness with pronation supination flexion extension, no crepitance instability, neurovascular intact distally.    Images:  X-rays three views right wrist demonstrate a majority healed distal radius fracture. Images Reviewed and discussed with patient.    Assessment:  1. Closed fracture of distal end of right radius, unspecified fracture morphology, initial encounter  - X-Ray Wrist Complete Right; Future  - Ambulatory referral/consult to Physical/Occupational Therapy; Future    2. Closed nondisplaced fracture of styloid process of right ulna, initial encounter  - X-Ray Wrist Complete Right; Future  - Ambulatory referral/consult to Physical/Occupational Therapy; Future        Plan:  At this time we discussed his physical exam and x-ray findings.  He is healing nicely, we will start physical therapy to regain some strength and motion.  I would like see back in 4 weeks repeat exam including x-rays.    Follow UP: No follow-ups on file.              "

## 2024-12-12 ENCOUNTER — HOSPITAL ENCOUNTER (OUTPATIENT)
Dept: RADIOLOGY | Facility: CLINIC | Age: 39
Discharge: HOME | End: 2024-12-12
Attending: ORTHOPAEDIC SURGERY
Payer: MEDICARE

## 2024-12-12 ENCOUNTER — OFFICE VISIT (OUTPATIENT)
Dept: ORTHOPEDICS | Facility: CLINIC | Age: 39
End: 2024-12-12
Payer: MEDICARE

## 2024-12-12 VITALS — BODY MASS INDEX: 24.19 KG/M2 | WEIGHT: 154.13 LBS | HEIGHT: 67 IN

## 2024-12-12 DIAGNOSIS — S52.501A CLOSED FRACTURE OF DISTAL END OF RIGHT RADIUS, UNSPECIFIED FRACTURE MORPHOLOGY, INITIAL ENCOUNTER: ICD-10-CM

## 2024-12-12 DIAGNOSIS — S52.501A CLOSED FRACTURE OF DISTAL END OF RIGHT RADIUS, UNSPECIFIED FRACTURE MORPHOLOGY, INITIAL ENCOUNTER: Primary | ICD-10-CM

## 2024-12-12 PROCEDURE — 99213 OFFICE O/P EST LOW 20 MIN: CPT | Mod: ,,, | Performed by: ORTHOPAEDIC SURGERY

## 2024-12-12 PROCEDURE — 3008F BODY MASS INDEX DOCD: CPT | Mod: CPTII,,, | Performed by: ORTHOPAEDIC SURGERY

## 2024-12-12 PROCEDURE — 1160F RVW MEDS BY RX/DR IN RCRD: CPT | Mod: CPTII,,, | Performed by: ORTHOPAEDIC SURGERY

## 2024-12-12 PROCEDURE — 1159F MED LIST DOCD IN RCRD: CPT | Mod: CPTII,,, | Performed by: ORTHOPAEDIC SURGERY

## 2024-12-12 PROCEDURE — 73110 X-RAY EXAM OF WRIST: CPT | Mod: RT,,, | Performed by: ORTHOPAEDIC SURGERY

## 2024-12-14 NOTE — PROGRESS NOTES
"Subjective:    CC: Follow-up of the Right Wrist (Rt wrist fx, reports rom improvement, unable to lift anything at this time, states pain has been off and no, presents without brace, no other complaints, )       HPI:  Patient returns today for repeat exam.  Patient states overall his right wrist is feeling better.  He denies any new complaints.    ROS: Refer to HPI for pertinent ROS. All other 12 point systems negative.    Objective:  Vitals:    12/12/24 1400   Weight: 69.9 kg (154 lb 1.6 oz)   Height: 5' 7" (1.702 m)        Physical Exam:  Right wrist compartment soft and warm.  Skin is intact.  There was no signs symptoms of DVT or infection.  There was no bruising or swelling.  Minimal tenderness.  He has some mild stiffness with pronation supination flexion extension otherwise stable to stressing, neurovascular intact distally.    Images:  X-rays three views right wrist demonstrates slight angulation, majority healed distal radius fracture. Images Reviewed and discussed with patient.    Assessment:  1. Closed fracture of distal end of right radius, unspecified fracture morphology, initial encounter  - X-Ray Wrist Complete Right; Future        Plan:  At this time we discussed his physical exam and x-ray findings.  Patient is 2 months from his distal radius fracture.  He is majority healed.  We have discussed some formal therapy, he would like to try this on his own.  I believe he can start weight-bearing as tolerated.  We have discussed letting pain be his guide.  Patient states he will call or return sooner if there is any new problems or difficulties.    Follow UP: No follow-ups on file.              "

## 2025-01-24 ENCOUNTER — HOSPITAL ENCOUNTER (OUTPATIENT)
Dept: RADIOLOGY | Facility: CLINIC | Age: 40
Discharge: HOME | End: 2025-01-24
Attending: ORTHOPAEDIC SURGERY
Payer: MEDICARE

## 2025-01-24 ENCOUNTER — OFFICE VISIT (OUTPATIENT)
Dept: ORTHOPEDICS | Facility: CLINIC | Age: 40
End: 2025-01-24
Payer: MEDICARE

## 2025-01-24 DIAGNOSIS — S52.501D CLOSED FRACTURE OF DISTAL END OF RIGHT RADIUS WITH ROUTINE HEALING, UNSPECIFIED FRACTURE MORPHOLOGY, SUBSEQUENT ENCOUNTER: Primary | ICD-10-CM

## 2025-01-24 DIAGNOSIS — S52.501D CLOSED FRACTURE OF DISTAL END OF RIGHT RADIUS WITH ROUTINE HEALING, UNSPECIFIED FRACTURE MORPHOLOGY, SUBSEQUENT ENCOUNTER: ICD-10-CM

## 2025-01-24 PROCEDURE — 1159F MED LIST DOCD IN RCRD: CPT | Mod: CPTII,,, | Performed by: ORTHOPAEDIC SURGERY

## 2025-01-24 PROCEDURE — 73110 X-RAY EXAM OF WRIST: CPT | Mod: RT,,, | Performed by: ORTHOPAEDIC SURGERY

## 2025-01-24 PROCEDURE — 1160F RVW MEDS BY RX/DR IN RCRD: CPT | Mod: CPTII,,, | Performed by: ORTHOPAEDIC SURGERY

## 2025-01-24 PROCEDURE — 99213 OFFICE O/P EST LOW 20 MIN: CPT | Mod: ,,, | Performed by: ORTHOPAEDIC SURGERY

## 2025-02-27 ENCOUNTER — HOSPITAL ENCOUNTER (EMERGENCY)
Facility: HOSPITAL | Age: 40
Discharge: HOME | End: 2025-02-27
Attending: EMERGENCY MEDICINE
Payer: MEDICARE

## 2025-02-27 VITALS
TEMPERATURE: 98 F | RESPIRATION RATE: 20 BRPM | SYSTOLIC BLOOD PRESSURE: 115 MMHG | BODY MASS INDEX: 24.48 KG/M2 | HEART RATE: 99 BPM | DIASTOLIC BLOOD PRESSURE: 92 MMHG | HEIGHT: 67 IN | WEIGHT: 156 LBS | OXYGEN SATURATION: 98 %

## 2025-02-27 DIAGNOSIS — B34.9 VIRAL SYNDROME: Primary | ICD-10-CM

## 2025-02-27 DIAGNOSIS — R06.02 SOB (SHORTNESS OF BREATH): ICD-10-CM

## 2025-02-27 LAB
FLUAV AG UPPER RESP QL IA.RAPID: NOT DETECTED
FLUBV AG UPPER RESP QL IA.RAPID: NOT DETECTED
OHS QRS DURATION: 92 MS
OHS QTC CALCULATION: 439 MS
RSV A 5' UTR RNA NPH QL NAA+PROBE: NOT DETECTED
SARS-COV-2 RNA RESP QL NAA+PROBE: NOT DETECTED

## 2025-02-27 PROCEDURE — 93010 ELECTROCARDIOGRAM REPORT: CPT | Mod: ,,, | Performed by: INTERNAL MEDICINE

## 2025-02-27 PROCEDURE — 25000003 PHARM REV CODE 250: Performed by: EMERGENCY MEDICINE

## 2025-02-27 PROCEDURE — 99283 EMERGENCY DEPT VISIT LOW MDM: CPT | Mod: 25

## 2025-02-27 PROCEDURE — 0241U COVID/RSV/FLU A&B PCR: CPT | Performed by: EMERGENCY MEDICINE

## 2025-02-27 PROCEDURE — 93005 ELECTROCARDIOGRAM TRACING: CPT

## 2025-02-27 RX ORDER — ONDANSETRON 4 MG/1
8 TABLET, ORALLY DISINTEGRATING ORAL
Status: COMPLETED | OUTPATIENT
Start: 2025-02-27 | End: 2025-02-27

## 2025-02-27 RX ORDER — ONDANSETRON 8 MG/1
8 TABLET, ORALLY DISINTEGRATING ORAL EVERY 6 HOURS PRN
Qty: 20 TABLET | Refills: 0 | Status: SHIPPED | OUTPATIENT
Start: 2025-02-27 | End: 2025-03-04

## 2025-02-27 RX ADMIN — ONDANSETRON 8 MG: 4 TABLET, ORALLY DISINTEGRATING ORAL at 09:02

## 2025-02-27 NOTE — ED PROVIDER NOTES
"Encounter Date: 2/27/2025       History     Chief Complaint   Patient presents with    Generalized Body Aches     Reports of generalized body aches, fatigue, chills, cough, nausea x 4 days. Also reports intermittent SOB "when I get the chills"     The history is provided by the patient.   Illness   The current episode started several days ago. The problem has been unchanged. Nothing relieves the symptoms. Nothing aggravates the symptoms. Associated symptoms include nausea, vomiting, muscle aches, cough and shortness of breath. Pertinent negatives include no fever, no diarrhea, no sore throat and no rash.   States he feels too ill to work today.    Review of patient's allergies indicates:   Allergen Reactions    Aspirin Shortness Of Breath    Ibuprofen Other (See Comments)     Per patient triggers " my asthma".     Penicillins      Past Medical History:   Diagnosis Date    Anxiety     Anxiety disorder, unspecified     Closed fracture of distal end of right radius     Closed nondisplaced fracture of styloid process of right ulna     GERD (gastroesophageal reflux disease)     Gout, unspecified     Sciatic nerve disease      History reviewed. No pertinent surgical history.  Family History   Problem Relation Name Age of Onset    No Known Problems Mother      No Known Problems Father       Social History[1]  Review of Systems   Constitutional:  Negative for fever.   HENT:  Negative for sore throat.    Respiratory:  Positive for cough and shortness of breath.    Cardiovascular:  Negative for chest pain.   Gastrointestinal:  Positive for nausea and vomiting. Negative for diarrhea.   Genitourinary:  Negative for dysuria.   Musculoskeletal:  Negative for back pain.   Skin:  Negative for rash.   Neurological:  Negative for weakness.   Hematological:  Does not bruise/bleed easily.       Physical Exam     Initial Vitals [02/27/25 0933]   BP Pulse Resp Temp SpO2   125/81 94 20 98 °F (36.7 °C) 100 %      MAP       --     "     Physical Exam    Nursing note and vitals reviewed.  Constitutional: He appears well-developed and well-nourished.   HENT:   Head: Normocephalic and atraumatic.   Right Ear: External ear normal.   Left Ear: External ear normal.   Nose: Nose normal.   Eyes: Conjunctivae and EOM are normal. Pupils are equal, round, and reactive to light.   Neck: Neck supple.   Normal range of motion.  Cardiovascular:  Normal rate, regular rhythm, normal heart sounds and intact distal pulses.           Pulmonary/Chest: Breath sounds normal.   Abdominal: Abdomen is soft. Bowel sounds are normal.   Musculoskeletal:         General: Normal range of motion.      Cervical back: Normal range of motion and neck supple.     Neurological: He is alert and oriented to person, place, and time. He has normal strength. GCS score is 15. GCS eye subscore is 4. GCS verbal subscore is 5. GCS motor subscore is 6.   Skin: Skin is warm and dry. Capillary refill takes less than 2 seconds.   Psychiatric: He has a normal mood and affect. His behavior is normal. Judgment and thought content normal.         ED Course   Procedures  Labs Reviewed   COVID/RSV/FLU A&B PCR - Normal       Result Value    Influenza A PCR Not Detected      Influenza B PCR Not Detected      Respiratory Syncytial Virus PCR Not Detected      SARS-CoV-2 PCR Not Detected      Narrative:     The Xpert Xpress SARS-CoV-2/FLU/RSV plus is a rapid, multiplexed real-time PCR test intended for the simultaneous qualitative detection and differentiation of SARS-CoV-2, Influenza A, Influenza B, and respiratory syncytial virus (RSV) viral RNA in either nasopharyngeal swab or nasal swab specimens.           EKG Readings: (Independently Interpreted)   Initial Reading: No STEMI. Rhythm: Normal Sinus Rhythm. Heart Rate: 96. Ectopy: No Ectopy. Conduction: RBBB. ST Segments: Normal ST Segments. T Waves: Normal. Axis: Normal. Clinical Impression: Normal Sinus Rhythm with RBBB     ECG Results               EKG 12-lead (In process)        Collection Time Result Time QRS Duration OHS QTC Calculation    02/27/25 09:34:58 02/27/25 10:43:22 92 439                     In process by Interface, Lab In [facility] (02/27/25 10:43:24)                   Narrative:    Test Reason : R06.02,    Vent. Rate :  96 BPM     Atrial Rate :  96 BPM     P-R Int : 138 ms          QRS Dur :  92 ms      QT Int : 348 ms       P-R-T Axes :  82  83  77 degrees    QTcB Int : 439 ms    Normal sinus rhythm  Right atrial enlargement  Pulmonary disease pattern  Incomplete right bundle branch block  Abnormal ECG  When compared with ECG of 23-Aug-2024 10:31,  No significant change was found    Referred By: AAAREFERRAL SELF           Confirmed By:                                   Imaging Results    None          Medications   ondansetron disintegrating tablet 8 mg (8 mg Oral Given 2/27/25 0955)     Medical Decision Making  Risk  Prescription drug management.               ED Course as of 02/27/25 1105   Thu Feb 27, 2025   1102 At the time of D/C, the patient became very upset and belligerent, stating that he had been lying in the bed and no one gave him IV fluids.  I explained that he showed no clinical signs of dehydration and therefore, IV fluids were not warranted.  He began to escalate, saying that every time he comes to the ED, he gets IV fluids and steroids.  I explained that steroids were not indicated for his complaint today.  He proceeded to curse at me and lamented about his patient rights and that he was going to complain to the supervisor and call his .  I had the nursing staff provide him with his discharge papers and he left the ED cursing loudly.   [CL]      ED Course User Index  [CL] Ludin Salazar MD          Differential includes:  flu, COVID, viral illness, gastroenteritis, malingering.  Will obtain EKG, flu/COVID swabs and give antiemetic.                 Clinical Impression:  Final diagnoses:  [R06.02] SOB (shortness of  breath)  [B34.9] Viral syndrome (Primary)          ED Disposition Condition    Discharge Stable          ED Prescriptions       Medication Sig Dispense Start Date End Date Auth. Provider    ondansetron (ZOFRAN-ODT) 8 MG TbDL Take 1 tablet (8 mg total) by mouth every 6 (six) hours as needed (nausea). 20 tablet 2/27/2025 3/4/2025 Ludin Salazar MD          Follow-up Information       Follow up With Specialties Details Why Contact Info    Damian Stoll, DO Family Medicine   [address] [facility] [place] [address]  [phone]                 Ludin Salazar MD  02/27/25 1051         [1]   Social History  Tobacco Use    Smoking status: Every Day     Current packs/day: 0.50     Types: Cigarettes    Smokeless tobacco: Never   Substance Use Topics    Alcohol use: Yes    Drug use: Yes     Types: Marijuana        Ludin Salazar MD  02/27/25 7777

## 2025-07-10 ENCOUNTER — HOSPITAL ENCOUNTER (EMERGENCY)
Facility: HOSPITAL | Age: 40
Discharge: HOME | End: 2025-07-11
Attending: STUDENT IN AN ORGANIZED HEALTH CARE EDUCATION/TRAINING PROGRAM
Payer: MEDICARE

## 2025-07-10 DIAGNOSIS — R06.02 SOB (SHORTNESS OF BREATH): ICD-10-CM

## 2025-07-10 DIAGNOSIS — K21.9 GASTROESOPHAGEAL REFLUX DISEASE, UNSPECIFIED WHETHER ESOPHAGITIS PRESENT: Primary | ICD-10-CM

## 2025-07-10 DIAGNOSIS — R11.0 NAUSEA: ICD-10-CM

## 2025-07-10 LAB
ALBUMIN SERPL-MCNC: 4 G/DL (ref 3.5–5)
ALBUMIN/GLOB SERPL: 1.1 RATIO (ref 1.1–2)
ALP SERPL-CCNC: 91 UNIT/L (ref 40–150)
ALT SERPL-CCNC: 10 UNIT/L (ref 0–55)
ANION GAP SERPL CALC-SCNC: 16 MEQ/L
AST SERPL-CCNC: 14 UNIT/L (ref 11–45)
BACTERIA #/AREA URNS AUTO: ABNORMAL /HPF
BASOPHILS # BLD AUTO: 0.05 X10(3)/MCL
BASOPHILS NFR BLD AUTO: 0.6 %
BILIRUB SERPL-MCNC: 0.6 MG/DL
BILIRUB UR QL STRIP.AUTO: NEGATIVE
BNP BLD-MCNC: <10 PG/ML
BUN SERPL-MCNC: 8.6 MG/DL (ref 8.9–20.6)
CALCIUM SERPL-MCNC: 9.4 MG/DL (ref 8.4–10.2)
CHLORIDE SERPL-SCNC: 101 MMOL/L (ref 98–107)
CLARITY UR: CLEAR
CO2 SERPL-SCNC: 21 MMOL/L (ref 22–29)
COLOR UR AUTO: ABNORMAL
CREAT SERPL-MCNC: 1.16 MG/DL (ref 0.72–1.25)
CREAT/UREA NIT SERPL: 7
EOSINOPHIL # BLD AUTO: 0.19 X10(3)/MCL (ref 0–0.9)
EOSINOPHIL NFR BLD AUTO: 2.2 %
ERYTHROCYTE [DISTWIDTH] IN BLOOD BY AUTOMATED COUNT: 12.6 % (ref 11.5–17)
GFR SERPLBLD CREATININE-BSD FMLA CKD-EPI: >60 ML/MIN/1.73/M2
GLOBULIN SER-MCNC: 3.7 GM/DL (ref 2.4–3.5)
GLUCOSE SERPL-MCNC: 76 MG/DL (ref 74–100)
GLUCOSE UR QL STRIP: NORMAL
HCT VFR BLD AUTO: 37.5 % (ref 42–52)
HGB BLD-MCNC: 12.3 G/DL (ref 14–18)
HGB UR QL STRIP: NEGATIVE
IMM GRANULOCYTES # BLD AUTO: 0.03 X10(3)/MCL (ref 0–0.04)
IMM GRANULOCYTES NFR BLD AUTO: 0.4 %
KETONES UR QL STRIP: ABNORMAL
LEUKOCYTE ESTERASE UR QL STRIP: NEGATIVE
LIPASE SERPL-CCNC: 8 U/L
LYMPHOCYTES # BLD AUTO: 1.66 X10(3)/MCL (ref 0.6–4.6)
LYMPHOCYTES NFR BLD AUTO: 19.5 %
MAGNESIUM SERPL-MCNC: 1.8 MG/DL (ref 1.6–2.6)
MCH RBC QN AUTO: 24.9 PG (ref 27–31)
MCHC RBC AUTO-ENTMCNC: 32.8 G/DL (ref 33–36)
MCV RBC AUTO: 76.1 FL (ref 80–94)
MONOCYTES # BLD AUTO: 0.86 X10(3)/MCL (ref 0.1–1.3)
MONOCYTES NFR BLD AUTO: 10.1 %
MUCOUS THREADS URNS QL MICRO: ABNORMAL /LPF
NEUTROPHILS # BLD AUTO: 5.72 X10(3)/MCL (ref 2.1–9.2)
NEUTROPHILS NFR BLD AUTO: 67.2 %
NITRITE UR QL STRIP: NEGATIVE
NRBC BLD AUTO-RTO: 0 %
PH UR STRIP: 6 [PH]
PLATELET # BLD AUTO: 170 X10(3)/MCL (ref 130–400)
PMV BLD AUTO: 10.6 FL (ref 7.4–10.4)
POTASSIUM SERPL-SCNC: 3.3 MMOL/L (ref 3.5–5.1)
PROT SERPL-MCNC: 7.7 GM/DL (ref 6.4–8.3)
PROT UR QL STRIP: ABNORMAL
RBC # BLD AUTO: 4.93 X10(6)/MCL (ref 4.7–6.1)
RBC #/AREA URNS AUTO: ABNORMAL /HPF
SODIUM SERPL-SCNC: 138 MMOL/L (ref 136–145)
SP GR UR STRIP.AUTO: 1.04 (ref 1–1.03)
SQUAMOUS #/AREA URNS LPF: ABNORMAL /HPF
TROPONIN I SERPL-MCNC: <0.01 NG/ML (ref 0–0.04)
UROBILINOGEN UR STRIP-ACNC: NORMAL
WBC # BLD AUTO: 8.51 X10(3)/MCL (ref 4.5–11.5)
WBC #/AREA URNS AUTO: ABNORMAL /HPF

## 2025-07-10 PROCEDURE — 84484 ASSAY OF TROPONIN QUANT: CPT

## 2025-07-10 PROCEDURE — 83880 ASSAY OF NATRIURETIC PEPTIDE: CPT

## 2025-07-10 PROCEDURE — 83735 ASSAY OF MAGNESIUM: CPT

## 2025-07-10 PROCEDURE — 63600175 PHARM REV CODE 636 W HCPCS

## 2025-07-10 PROCEDURE — 81001 URINALYSIS AUTO W/SCOPE: CPT | Performed by: PHYSICIAN ASSISTANT

## 2025-07-10 PROCEDURE — 80053 COMPREHEN METABOLIC PANEL: CPT | Performed by: PHYSICIAN ASSISTANT

## 2025-07-10 PROCEDURE — 96374 THER/PROPH/DIAG INJ IV PUSH: CPT | Mod: 59

## 2025-07-10 PROCEDURE — 99285 EMERGENCY DEPT VISIT HI MDM: CPT | Mod: 25

## 2025-07-10 PROCEDURE — 63600175 PHARM REV CODE 636 W HCPCS: Performed by: PHYSICIAN ASSISTANT

## 2025-07-10 PROCEDURE — 25000003 PHARM REV CODE 250

## 2025-07-10 PROCEDURE — 85025 COMPLETE CBC W/AUTO DIFF WBC: CPT | Performed by: PHYSICIAN ASSISTANT

## 2025-07-10 PROCEDURE — 83690 ASSAY OF LIPASE: CPT | Performed by: PHYSICIAN ASSISTANT

## 2025-07-10 PROCEDURE — 93005 ELECTROCARDIOGRAM TRACING: CPT

## 2025-07-10 PROCEDURE — 25500020 PHARM REV CODE 255

## 2025-07-10 PROCEDURE — 93010 ELECTROCARDIOGRAM REPORT: CPT | Mod: ,,, | Performed by: INTERNAL MEDICINE

## 2025-07-10 PROCEDURE — 96361 HYDRATE IV INFUSION ADD-ON: CPT

## 2025-07-10 PROCEDURE — 96375 TX/PRO/DX INJ NEW DRUG ADDON: CPT

## 2025-07-10 RX ORDER — ONDANSETRON HYDROCHLORIDE 2 MG/ML
4 INJECTION, SOLUTION INTRAVENOUS
Status: COMPLETED | OUTPATIENT
Start: 2025-07-10 | End: 2025-07-10

## 2025-07-10 RX ORDER — SODIUM CHLORIDE 9 MG/ML
1000 INJECTION, SOLUTION INTRAVENOUS
Status: COMPLETED | OUTPATIENT
Start: 2025-07-10 | End: 2025-07-10

## 2025-07-10 RX ORDER — PANTOPRAZOLE SODIUM 40 MG/10ML
40 INJECTION, POWDER, LYOPHILIZED, FOR SOLUTION INTRAVENOUS
Status: COMPLETED | OUTPATIENT
Start: 2025-07-10 | End: 2025-07-10

## 2025-07-10 RX ADMIN — ONDANSETRON 4 MG: 2 INJECTION INTRAMUSCULAR; INTRAVENOUS at 08:07

## 2025-07-10 RX ADMIN — PANTOPRAZOLE SODIUM 40 MG: 40 INJECTION, POWDER, LYOPHILIZED, FOR SOLUTION INTRAVENOUS at 09:07

## 2025-07-10 RX ADMIN — IOHEXOL 100 ML: 350 INJECTION, SOLUTION INTRAVENOUS at 09:07

## 2025-07-10 RX ADMIN — SODIUM CHLORIDE 1000 ML: 9 INJECTION, SOLUTION INTRAVENOUS at 09:07

## 2025-07-10 NOTE — FIRST PROVIDER EVALUATION
"Medical screening examination initiated.  I have conducted a focused provider triage encounter, findings are as follows:  Chief Complaint   Patient presents with    Abdominal Pain     Reports taken off of IBS meds last week. C/o abd pain, diarrhea since Sunday. Became constipated Monday per patient. Also c/o headache. GCS 15.       Brief history of present illness:  39-year-old male presents to ED for evaluation of abdominal pain and diarrhea since Sunday.  Reports recently stopped taking his IBS meds last week.    Vitals:    07/10/25 1440   BP: 125/87   Pulse: 88   Resp: 20   Temp: 97.3 °F (36.3 °C)   TempSrc: Oral   SpO2: 100%   Weight: 65.8 kg (145 lb)   Height: 5' 7" (1.702 m)       Pertinent physical exam:  Patient is awake and alert and oriented.  Ambulatory to triage.  In no acute distress.      Brief workup plan:  labs, UA, IV, meds    Preliminary workup initiated; this workup will be continued and followed by the physician or advanced practice provider that is assigned to the patient when roomed.  "

## 2025-07-11 VITALS
HEIGHT: 67 IN | DIASTOLIC BLOOD PRESSURE: 73 MMHG | OXYGEN SATURATION: 99 % | HEART RATE: 82 BPM | WEIGHT: 145 LBS | SYSTOLIC BLOOD PRESSURE: 110 MMHG | BODY MASS INDEX: 22.76 KG/M2 | TEMPERATURE: 97 F | RESPIRATION RATE: 25 BRPM

## 2025-07-11 LAB
OHS QRS DURATION: 96 MS
OHS QTC CALCULATION: 447 MS

## 2025-07-11 RX ORDER — ONDANSETRON 4 MG/1
4 TABLET, FILM COATED ORAL EVERY 6 HOURS PRN
Qty: 12 TABLET | Refills: 0 | Status: SHIPPED | OUTPATIENT
Start: 2025-07-11

## 2025-07-11 NOTE — DISCHARGE INSTRUCTIONS
Thanks for letting us take care of you today!  It is our goal to give you courteous care and to keep you comfortable and informed, if you have any questions before you leave I will be happy to try and answer them.    Here is some advice after your visit:      Your visit in the emergency department is NOT definitive care - please follow-up with your primary care doctor and/or specialist within 1 week.  Please return if you have any worsening in your condition or if you have any other concerns.    If you had radiology exams like an XRAY or CT in the emergency Department the interpreation on them may be preliminary - there may be less time sensitive findings on the reports please obtain these reports within 24 hours from the hospital or by using your out on your mobile phone to access records.  Bring these to your primary care doctor and/or specialist for further review of incidental findings.    Please review any LAB WORK from your visit today with your primary care physician.

## 2025-07-11 NOTE — ED PROVIDER NOTES
"Encounter Date: 7/10/2025       History     Chief Complaint   Patient presents with    Abdominal Pain     Reports taken off of IBS meds last week. C/o abd pain, diarrhea since Sunday. Became constipated Monday per patient. Also c/o headache. GCS 15.     39 y.o.  male with a history of IBS, Anxiety, and GERD presents to Emergency Department with a chief complaint of heartburn. Symptoms began over 1 week ago and have been constant since onset. Patient reports ENT took him off Bentyl and Protonix for allergy testing. Patient seen at [facility] several days ago for same complaints. Associated symptoms include constipation and poor appetite. Patient has not been taking medication at home. The patient denies CP, SOB, fever, chills, vomiting, or abdominal pain. States he has no abdominal pain currently. No other reported symptoms at this time. LBM: today.       The history is provided by the patient. No  was used.   Illness   The current episode started several days ago. The problem occurs continuously. The problem has been unchanged. Associated symptoms include constipation. Pertinent negatives include no fever, no photophobia, no abdominal pain, no nausea, no vomiting, no stridor, no neck stiffness, no cough, no shortness of breath, no URI, no wheezing, no rash and no diaper rash. Services received include medications given and tests performed. Recently, medical care has been given at another facility.     Review of patient's allergies indicates:   Allergen Reactions    Aspirin Shortness Of Breath    Ibuprofen Other (See Comments)     Per patient triggers " my asthma".     Penicillins      Past Medical History:   Diagnosis Date    Anxiety     Anxiety disorder, unspecified     Closed fracture of distal end of right radius     Closed nondisplaced fracture of styloid process of right ulna     GERD (gastroesophageal reflux disease)     Gout, unspecified     IBS (irritable bowel syndrome)  "    Sciatic nerve disease      History reviewed. No pertinent surgical history.  Family History   Problem Relation Name Age of Onset    No Known Problems Mother      No Known Problems Father       Social History[1]  Review of Systems   Constitutional:  Negative for chills, diaphoresis, fatigue and fever.   Eyes:  Negative for photophobia.   Respiratory:  Negative for cough, shortness of breath, wheezing and stridor.    Cardiovascular:  Negative for chest pain.   Gastrointestinal:  Positive for constipation. Negative for abdominal pain, nausea and vomiting.        Heartburn   Skin:  Negative for color change, pallor and rash.   All other systems reviewed and are negative.      Physical Exam     Initial Vitals [07/10/25 1440]   BP Pulse Resp Temp SpO2   125/87 88 20 97.3 °F (36.3 °C) 100 %      MAP       --         Physical Exam    Nursing note and vitals reviewed.  Constitutional: He appears well-developed and well-nourished. He is not diaphoretic. He is cooperative.  Non-toxic appearance. No distress.   HENT:   Head: Normocephalic and atraumatic. Head is without abrasion, without contusion, without right periorbital erythema and without left periorbital erythema. Hair is normal.   Right Ear: External ear normal.   Left Ear: External ear normal.   Nose: Nose normal.   Eyes: Conjunctivae and EOM are normal. Pupils are equal, round, and reactive to light.   Neck: Neck supple. No thyromegaly present. No tracheal deviation present.   Normal range of motion.  Cardiovascular:  Normal rate, regular rhythm, S1 normal, normal heart sounds, intact distal pulses and normal pulses.           Pulmonary/Chest: Effort normal and breath sounds normal. No stridor. No tachypnea and no bradypnea. No respiratory distress. He has no decreased breath sounds. He has no wheezes. He has no rhonchi. He has no rales. He exhibits no tenderness.   In no respiratory distress. Able to speak in complete sentences.    Abdominal: Abdomen is soft.  Bowel sounds are normal. He exhibits no distension. There is no abdominal tenderness.   No abdominal pain on exam. No guarding. No peritoneal signs.    No right CVA tenderness.  No left CVA tenderness. There is no rebound and no guarding.   Musculoskeletal:         General: Normal range of motion.      Cervical back: Normal range of motion and neck supple.     Neurological: He is alert and oriented to person, place, and time. He has normal strength. No cranial nerve deficit or sensory deficit. He displays a negative Romberg sign. GCS score is 15. GCS eye subscore is 4. GCS verbal subscore is 5. GCS motor subscore is 6.   Skin: Skin is warm and dry. Capillary refill takes less than 2 seconds.   Psychiatric: He has a normal mood and affect. Thought content normal.         ED Course   Procedures  Labs Reviewed   COMPREHENSIVE METABOLIC PANEL - Abnormal       Result Value    Sodium 138      Potassium 3.3 (*)     Chloride 101      CO2 21 (*)     Glucose 76      Blood Urea Nitrogen 8.6 (*)     Creatinine 1.16      Calcium 9.4      Protein Total 7.7      Albumin 4.0      Globulin 3.7 (*)     Albumin/Globulin Ratio 1.1      Bilirubin Total 0.6      ALP 91      ALT 10      AST 14      eGFR >60      Anion Gap 16.0      BUN/Creatinine Ratio 7     URINALYSIS, REFLEX TO URINE CULTURE - Abnormal    Color, UA Light-Yellow      Appearance, UA Clear      Specific Gravity, UA 1.044 (*)     pH, UA 6.0      Protein, UA Trace (*)     Glucose, UA Normal      Ketones, UA 4+ (*)     Blood, UA Negative      Bilirubin, UA Negative      Urobilinogen, UA Normal      Nitrites, UA Negative      Leukocyte Esterase, UA Negative      RBC, UA 0-5      WBC, UA 0-5      Bacteria, UA None Seen      Squamous Epithelial Cells, UA None Seen      Mucous, UA Few (*)    CBC WITH DIFFERENTIAL - Abnormal    WBC 8.51      RBC 4.93      Hgb 12.3 (*)     Hct 37.5 (*)     MCV 76.1 (*)     MCH 24.9 (*)     MCHC 32.8 (*)     RDW 12.6      Platelet 170      MPV 10.6  (*)     Neut % 67.2      Lymph % 19.5      Mono % 10.1      Eos % 2.2      Basophil % 0.6      Imm Grans % 0.4      Neut # 5.72      Lymph # 1.66      Mono # 0.86      Eos # 0.19      Baso # 0.05      Imm Gran # 0.03      NRBC% 0.0     LIPASE - Normal    Lipase Level 8     MAGNESIUM - Normal    Magnesium Level 1.80     TROPONIN I - Normal    Troponin-I <0.010     B-TYPE NATRIURETIC PEPTIDE - Normal    Natriuretic Peptide <10.0     CBC W/ AUTO DIFFERENTIAL    Narrative:     The following orders were created for panel order CBC auto differential.  Procedure                               Abnormality         Status                     ---------                               -----------         ------                     CBC with Differential[[id number]]       Abnormal            Final result                 Please view results for these tests on the individual orders.     EKG Readings: (Independently Interpreted)   Initial Reading: No STEMI. Rhythm: Normal Sinus Rhythm. Heart Rate: 69.       Imaging Results              X-Ray Chest AP Portable (In process)                      CT Abdomen Pelvis With IV Contrast NO Oral Contrast (Final result)  Result time 07/10/25 21:48:07      Final result by Gilmar العلي MD (07/10/25 21:48:07)                   Impression:      Diverticulosis without evidence of acute diverticulitis      Electronically signed by: Gilmar العلي MD  Date:    07/10/2025  Time:    21:48               Narrative:    EXAMINATION:  CT ABDOMEN PELVIS WITH IV CONTRAST    CLINICAL HISTORY:  Abdominal abscess/infection suspected;    TECHNIQUE:  Low dose axial images, sagittal and coronal reformations were obtained from the lung bases to the pubic symphysis following the IV administration of 100 mL of Omnipaque 350 no oral contrast was given.    Automatic exposure control (AEC) was utilized for dose reduction.    Dose: 281 mGycm    COMPARISON:  08/12/2019    FINDINGS:  Lung bases appear clear.  Liver appears  normal.  Spleen appears normal.  Pancreas appears normal.  Biliary system appears normal.  The adrenals are not enlarged.  There is a small cyst of the right kidney.  Aorta shows no evidence of an aneurysm.  There are diverticulum in the colon without evidence of acute diverticulitis the appendix appears normal.                                       Medications   ondansetron injection 4 mg (4 mg Intravenous Given 7/10/25 2028)   0.9% NaCl infusion (0 mLs Intravenous Stopped 7/10/25 2215)   pantoprazole injection 40 mg (40 mg Intravenous Given 7/10/25 2112)   iohexoL (OMNIPAQUE 350) injection 100 mL (100 mLs Intravenous Given 7/10/25 2129)     Medical Decision Making  Patient awake, alert, has non-labored breathing, and follows commands appropriately. Arrived to ED due to heartburn and constipation. Reports he had abdominal pain and diarrhea that resolved. Afebrile. NAD Noted. Reports ENT took him off medications for allergy testing.     Judging by the patient's chief complaint and pertinent history, the patient has the following possible differential diagnoses, including but not limited to the following: IBS, Constipation, GERD, Dehydration     Some of these are deemed to be lower likelihood and some more likely based on my physical exam and history combined with possible lab work and/or imaging studies. Please see the pertinent studies, and refer to the HPI. Some of these diagnoses will take further evaluation to fully rule out, perhaps as an outpatient and the patient was encouraged to follow up when discharged for more comprehensive evaluation.       Amount and/or Complexity of Data Reviewed  Labs: ordered. Decision-making details documented in ED Course.     Details: No leukocytosis noted. H/H stable. UA- ketones noted. Informed patient of results.   Radiology: ordered and independent interpretation performed. Decision-making details documented in ED Course.     Details: XR- NAF. Informed patient of results.    ECG/medicine tests: ordered.  Discussion of management or test interpretation with external provider(s): Discussed plan of care and interventions with patient. Agreed to and aware of plan of care. Comfortable being discharged home. Patient discharged home. Patient denies new or additional complaints; no further tests indicated at this time. Verbalized understanding of instructions. No emergent or apparent distress noted prior to discharge. Strict ER return precautions given.       Risk  OTC drugs.  Prescription drug management.               ED Course as of 07/11/25 0049   u Jul 10, 2025   2100 WBC: 8.51 [JA]   2100 Hemoglobin(!): 12.3 [JA]   2100 Hematocrit(!): 37.5 [JA]   2100 Creatinine: 1.16 [JA]   2100 BUN(!): 8.6 [JA]   2100 Lipase: 8 [JA]   2153 CT Abdomen Pelvis With IV Contrast NO Oral Contrast  Lung bases appear clear.  Liver appears normal.  Spleen appears normal.  Pancreas appears normal.  Biliary system appears normal.  The adrenals are not enlarged.  There is a small cyst of the right kidney.  Aorta shows no evidence of an aneurysm.  There are diverticulum in the colon without evidence of acute diverticulitis the appendix appears normal. [JA]   2305 Patient now c/o SOB and headache. Also reports he may need food on his stomach to feel better. Will obtain additional testing.  [JA]   2354 Troponin I: <0.010 [JA]   Fri Jul 11, 2025   0020 BNP: <10.0 [JA]   0044 Had lengthy discussion with patient regarding results. Patient resting comfortably upon evaluation. Patient able to tolerate food prior to dispo home. Instructed to resume medications for GI complaints; Protonix and Bentyl. To f/u with gastro clinic for further outpatient management. At disposition, patient has no additional complaints, pain is not intractable, able to tolerate PO, in no respiratory distress, and has outpatient f/u. Stable for [place] home.  [JA]      ED Course User Index  [JA] Lisa Abdalla, NP                            Clinical Impression:  Final diagnoses:  [R06.02] SOB (shortness of breath)  [K21.9] Gastroesophageal reflux disease, unspecified whether esophagitis present (Primary)  [R11.0] Nausea          ED Disposition Condition    Discharge Good          ED Prescriptions       Medication Sig Dispense Start Date End Date Auth. Provider    ondansetron (ZOFRAN) 4 MG tablet Take 1 tablet (4 mg total) by mouth every 6 (six) hours as needed for Nausea. 12 tablet 7/11/2025 -- Lisa Abdalla, ANISA          Follow-up Information       Follow up With Specialties Details Why Contact Info    Damian Stoll,  Family Medicine Schedule an appointment as soon as possible for a visit in 1 day  [id number] [facility] [place] [id number] [phone]      [facility] - Emergency Dept Emergency Medicine Go to  If symptoms worsen, As needed Count includes the [facility] [address] [phone]    Follow up with GI clinic in 1 day.                       [1]   Social History  Tobacco Use    Smoking status: Every Day     Current packs/day: 0.50     Types: Cigarettes    Smokeless tobacco: Never   Substance Use Topics    Alcohol use: Yes    Drug use: Yes     Types: Marijuana        Lisa Abdalla, NP  07/11/25 0050